# Patient Record
Sex: FEMALE | Race: BLACK OR AFRICAN AMERICAN | NOT HISPANIC OR LATINO | Employment: FULL TIME | URBAN - METROPOLITAN AREA
[De-identification: names, ages, dates, MRNs, and addresses within clinical notes are randomized per-mention and may not be internally consistent; named-entity substitution may affect disease eponyms.]

---

## 2017-03-29 ENCOUNTER — OFFICE VISIT (OUTPATIENT)
Dept: FAMILY MEDICINE CLINIC | Facility: CLINIC | Age: 42
End: 2017-03-29

## 2017-03-29 VITALS
DIASTOLIC BLOOD PRESSURE: 80 MMHG | TEMPERATURE: 98.3 F | OXYGEN SATURATION: 99 % | WEIGHT: 215.2 LBS | HEIGHT: 66 IN | SYSTOLIC BLOOD PRESSURE: 122 MMHG | BODY MASS INDEX: 34.58 KG/M2 | HEART RATE: 67 BPM | RESPIRATION RATE: 16 BRPM

## 2017-03-29 DIAGNOSIS — J32.9 SINUSITIS, UNSPECIFIED CHRONICITY, UNSPECIFIED LOCATION: Primary | ICD-10-CM

## 2017-03-29 DIAGNOSIS — R07.9 CHEST PAIN, UNSPECIFIED TYPE: ICD-10-CM

## 2017-03-29 DIAGNOSIS — G89.29 CHRONIC PAIN OF LEFT KNEE: ICD-10-CM

## 2017-03-29 DIAGNOSIS — M25.562 CHRONIC PAIN OF LEFT KNEE: ICD-10-CM
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
## 2017-03-29 PROCEDURE — 99214 OFFICE O/P EST MOD 30 MIN: CPT | Performed by: NURSE PRACTITIONER

## 2017-03-29 RX ORDER — AMOXICILLIN 875 MG/1
875 TABLET, COATED ORAL 2 TIMES DAILY
Qty: 20 TABLET | Refills: 0 | Status: SHIPPED | OUTPATIENT
Start: 2017-03-29 | End: 2018-01-11

## 2017-03-29 RX ORDER — FLUCONAZOLE 150 MG/1
150 TABLET ORAL ONCE
Qty: 1 TABLET | Refills: 0 | Status: SHIPPED | OUTPATIENT
Start: 2017-03-29 | End: 2017-03-29

## 2017-03-29 NOTE — PROGRESS NOTES
Subjective   Nico Chaudhary is a 41 y.o. female.     History of Present Illness   Knee Pain: Patient presents with knee pain involving the  left knee. Onset of the symptoms was a week ago. Inciting event: none known. Current symptoms include swelling. Pain is aggravated by kneeling, lateral movements, squatting and standing.  Patient has had prior knee problems. Evaluation to date: plain films: normal. Treatment to date: ice.    Upper Respiratory Infection: Patient complains of symptoms of a URI, possible sinusitis. Symptoms include congestion, irritability, sore throat and swollen glands. Onset of symptoms was 10 days ago, unchanged since that time. She also c/o facial pain for the past 6 days .  She is drinking plenty of fluids. Evaluation to date: none. Treatment to date: none.    Patient states she will be she was bitten by about 2 nights ago on her left forearm areas red and slightly swollen.  She's not been using any medications on it.    Patient states she started working out and doing cross fit.  Yesterday during her workouts she felt great she did not have any chest pain, shortness of breath, chest tightness nausea and dizziness or lightheadedness.  After her workouts she felt some pain off and on in her mid sternum.  Patient states yesterday she does a lot of strenuous exercises including jumps, squats with a medicine ball, burpees.   Hurts worse when she turns and looks to the left and she takes a really big deep breath in.    The following portions of the patient's history were reviewed and updated as appropriate: allergies, current medications, past social history and problem list.    Review of Systems   HENT: Positive for postnasal drip, rhinorrhea, sinus pressure and sore throat.    Cardiovascular: Positive for chest pain.   Musculoskeletal: Positive for joint swelling.       Objective   /80 (BP Location: Right arm, Patient Position: Sitting)  Pulse 67  Temp 98.3 °F (36.8 °C) (Oral)    "Resp 16  Ht 66\" (167.6 cm)  Wt 215 lb 3.2 oz (97.6 kg)  SpO2 99%  BMI 34.73 kg/m2  Physical Exam   Constitutional: She is oriented to person, place, and time. Vital signs are normal. She appears well-developed and well-nourished. No distress.   HENT:   Head: Normocephalic.   Right Ear: Tympanic membrane normal.   Left Ear: Tympanic membrane normal.   Nose: Rhinorrhea present.   Mouth/Throat: Posterior oropharyngeal erythema present.   Cardiovascular: Normal rate, regular rhythm and normal heart sounds.    Pulmonary/Chest: Effort normal and breath sounds normal.       Pain with palpation of mid chest palpation along sternum   Lymphadenopathy:     She has cervical adenopathy.   Neurological: She is alert and oriented to person, place, and time. Gait normal.   Skin:        Red area looks like an insect bite on left mid forearm no signs and symptoms of infection   Psychiatric: She has a normal mood and affect. Her behavior is normal. Judgment and thought content normal.   Vitals reviewed.      Assessment/Plan   Problem List Items Addressed This Visit        Respiratory    Sinusitis - Primary    Relevant Medications    amoxicillin (AMOXIL) 875 MG tablet    fluconazole (DIFLUCAN) 150 MG tablet       Nervous and Auditory    Chest pain       Musculoskeletal and Integument    Chronic pain of left knee       Other    Insect bite        Considering insect bite looks good without any infection we will leave it alone at this time.    Plan start amoxicillin for sinus infection return to the office if needed.    Considering she did not have any chest pain spreading shortness of breath nausea or dizziness with exertion and her chest pain reproduced with palpation of the chest I do not believe is cardiac in nature.  Use ibuprofen around the clock and heat to that area.    Patient has chronic left knee problems including a Baker cyst she thinks is been reaggravated considering the workouts that she's doing.  Use ice.  No need to " draw any fluid from it today.

## 2018-01-11 ENCOUNTER — OFFICE VISIT (OUTPATIENT)
Dept: FAMILY MEDICINE CLINIC | Facility: CLINIC | Age: 43
End: 2018-01-11

## 2018-01-11 VITALS
HEART RATE: 59 BPM | HEIGHT: 66 IN | OXYGEN SATURATION: 99 % | SYSTOLIC BLOOD PRESSURE: 134 MMHG | TEMPERATURE: 98.3 F | BODY MASS INDEX: 33.97 KG/M2 | WEIGHT: 211.4 LBS | DIASTOLIC BLOOD PRESSURE: 84 MMHG

## 2018-01-11 DIAGNOSIS — M25.561 ACUTE PAIN OF RIGHT KNEE: Primary | ICD-10-CM

## 2018-01-11 PROCEDURE — 99213 OFFICE O/P EST LOW 20 MIN: CPT | Performed by: NURSE PRACTITIONER

## 2018-01-11 RX ORDER — MONTELUKAST SODIUM 10 MG/1
10 TABLET ORAL NIGHTLY
COMMUNITY
End: 2022-10-05 | Stop reason: SDUPTHER

## 2018-01-11 NOTE — PROGRESS NOTES
"Subjective   Nico Plunkett is a 42 y.o. female.     History of Present Illness   Knee Pain: Patient presents with knee pain involving the  right knee. Onset of the symptoms was over 1 month ago. Inciting event: none known. Current symptoms include giving out, pain located fronty lower knee and swelling. Pain is aggravated by going up and down stairs, kneeling, lateral movements, running and squatting.  Patient has had no prior knee problems. Evaluation to date: none. Treatment to date: ice, OTC analgesics which are somewhat effective and rest.    The following portions of the patient's history were reviewed and updated as appropriate: allergies, current medications, past social history and problem list.    Review of Systems   Constitutional: Negative for fever.   All other systems reviewed and are negative.      Objective   /84 (BP Location: Right arm, Patient Position: Sitting)  Pulse 59  Temp 98.3 °F (36.8 °C)  Ht 167.6 cm (65.98\")  Wt 95.9 kg (211 lb 6.4 oz)  SpO2 99%  BMI 34.14 kg/m2  Physical Exam   Constitutional: She is oriented to person, place, and time. Vital signs are normal. She appears well-developed and well-nourished. No distress.   HENT:   Head: Normocephalic.   Cardiovascular: Normal rate, regular rhythm and normal heart sounds.    Pulmonary/Chest: Effort normal and breath sounds normal.   Neurological: She is alert and oriented to person, place, and time. Gait normal.   Psychiatric: She has a normal mood and affect. Her behavior is normal. Judgment and thought content normal.   Vitals reviewed.      Assessment/Plan   Problem List Items Addressed This Visit        Musculoskeletal and Integument    Acute pain of right knee - Primary    Relevant Medications    diclofenac (VOLTAREN) 50 MG EC tablet        Patellar tentonitis     KT tape  Rest and stop all offending exercises for 2-4 weeks  rto if needed  "

## 2020-05-28 ENCOUNTER — TELEPHONE (OUTPATIENT)
Dept: FAMILY MEDICINE CLINIC | Facility: CLINIC | Age: 45
End: 2020-05-28

## 2020-05-28 NOTE — TELEPHONE ENCOUNTER
PT CALLED SAYING THAT SHE BELIEVES THAT SHE MY HAVE BROKEN HER 4TH TOE ON HER LEFT FOOT. SHE SAYS THAT IS SWOLLEN AND EVEN SWOLLEN INTO THE FOOT. IT IS NOT DISCOLORED. PT WOULD LIKE TO KNOW IF SHE CAN GET AN ORDER FOR AN XRAY?     CALL BACK# [phone]

## 2020-06-04 NOTE — TELEPHONE ENCOUNTER
Lt message. Urgent care has a better supply of splints but if pt prefers to be evaluated in office. We can schedule pt with Dr.von Swift and go over office protocol with pt.

## 2020-06-04 NOTE — TELEPHONE ENCOUNTER
As of now no availability on Fabio's schedule for an appointment today or tomorrow, unless Fabio wants to look at schedule and fit pt in? Or refer pt to urgent care? Please advise.

## 2022-10-05 ENCOUNTER — OFFICE VISIT (OUTPATIENT)
Dept: INTERNAL MEDICINE | Facility: CLINIC | Age: 47
End: 2022-10-05

## 2022-10-05 VITALS
OXYGEN SATURATION: 100 % | RESPIRATION RATE: 16 BRPM | HEIGHT: 67 IN | DIASTOLIC BLOOD PRESSURE: 78 MMHG | HEART RATE: 75 BPM | SYSTOLIC BLOOD PRESSURE: 128 MMHG | TEMPERATURE: 97.7 F | BODY MASS INDEX: 36.66 KG/M2 | WEIGHT: 233.6 LBS

## 2022-10-05 DIAGNOSIS — Z00.00 ENCOUNTER FOR MEDICAL EXAMINATION TO ESTABLISH CARE: ICD-10-CM

## 2022-10-05 DIAGNOSIS — Z76.0 MEDICATION REFILL: ICD-10-CM

## 2022-10-05 DIAGNOSIS — J01.10 ACUTE NON-RECURRENT FRONTAL SINUSITIS: ICD-10-CM

## 2022-10-05 DIAGNOSIS — I10 PRIMARY HYPERTENSION: Primary | ICD-10-CM

## 2022-10-05 PROCEDURE — 99203 OFFICE O/P NEW LOW 30 MIN: CPT | Performed by: NURSE PRACTITIONER

## 2022-10-05 RX ORDER — AMLODIPINE BESYLATE 2.5 MG/1
2.5 TABLET ORAL DAILY
COMMUNITY
Start: 2022-10-02 | End: 2022-10-05

## 2022-10-05 RX ORDER — MONTELUKAST SODIUM 10 MG/1
10 TABLET ORAL NIGHTLY
Qty: 90 TABLET | Refills: 0 | Status: SHIPPED | OUTPATIENT
Start: 2022-10-05 | End: 2023-01-03

## 2022-10-05 RX ORDER — AMLODIPINE BESYLATE 2.5 MG/1
2.5 TABLET ORAL DAILY
Qty: 90 TABLET | Refills: 0 | Status: SHIPPED | OUTPATIENT
Start: 2022-10-05 | End: 2022-10-12

## 2022-10-05 RX ORDER — AMOXICILLIN AND CLAVULANATE POTASSIUM 875; 125 MG/1; MG/1
1 TABLET, FILM COATED ORAL 2 TIMES DAILY
Qty: 20 TABLET | Refills: 0 | Status: SHIPPED | OUTPATIENT
Start: 2022-10-05 | End: 2022-10-15

## 2022-10-05 RX ORDER — FLUCONAZOLE 150 MG/1
150 TABLET ORAL ONCE
Qty: 1 TABLET | Refills: 0 | Status: SHIPPED | OUTPATIENT
Start: 2022-10-05 | End: 2022-10-05

## 2022-10-05 NOTE — ASSESSMENT & PLAN NOTE
Hypertension is improving with treatment.  Continue current treatment regimen.  Dietary sodium restriction.  Weight loss.  Regular aerobic exercise.  Continue current medications.  Blood pressure will be reassessed in 4 weeks.  Amlodipine started. Denies any side effects at this time.   BP at goal 128/78.  She will do ambulatory monitoring at home.   If she develops any SOB or chest pain, she knows to go to the ER.

## 2022-10-05 NOTE — PROGRESS NOTES
"Chief Complaint  Establish Care and Hypertension (Flu shot )    Subjective        Nico Lemus presents to [facility]  History of Present Illness    Pleasant 46 year old female who is new to me and the office.   She previously saw a [facility] provider who is no longer there.   Recent hypertensive episode with H/A.   ER visit on 10/01/2022. They started patient on Amlodipine 2.5 mg tablet daily.   Her pressure today is at goal 128/78. Denies any chest pain/pressure at this time.     CT head negative for acute intracranial abnormality and dx with frontal sinus disease.   Patient c/o frontal sinus pressure and congestion greater than 7 days.     Objective   Vital Signs:  /78 (BP Location: Left arm, Patient Position: Sitting, Cuff Size: Adult)   Pulse 75   Temp 97.7 °F (36.5 °C) (Oral)   Resp 16   Ht 170.2 cm (67\")   Wt 106 kg (233 lb 9.6 oz)   SpO2 100%   BMI 36.59 kg/m²   Estimated body mass index is 36.59 kg/m² as calculated from the following:    Height as of this encounter: 170.2 cm (67\").    Weight as of this encounter: 106 kg (233 lb 9.6 oz).          Physical Exam  Vitals and nursing note reviewed.   Constitutional:       Appearance: Normal appearance. She is obese.   HENT:      Head: Normocephalic.      Nose: Congestion present.      Comments: Patient c/o frontal sinus pressure and congestion greater than 7 days.      Mouth/Throat:      Mouth: Mucous membranes are moist.   Eyes:      Pupils: Pupils are equal, round, and reactive to light.   Cardiovascular:      Rate and Rhythm: Normal rate and regular rhythm.      Pulses: Normal pulses.      Heart sounds: Normal heart sounds.      Comments: No peripheral edema noted.   Pulmonary:      Effort: Pulmonary effort is normal. No respiratory distress.      Breath sounds: Normal breath sounds. No stridor. No wheezing, rhonchi or rales.      Comments: Denies SOB  Chest:      Chest wall: No tenderness. "   Musculoskeletal:         General: Normal range of motion.   Skin:     General: Skin is warm.      Capillary Refill: Capillary refill takes less than 2 seconds.   Neurological:      Mental Status: She is alert and oriented to person, place, and time.   Psychiatric:         Behavior: Behavior normal.        Result Review :             Data reviewed from ER visit from 10/01/2022 with labs and CT scan as well.      Assessment and Plan   Diagnoses and all orders for this visit:    1. Primary hypertension (Primary)  Assessment & Plan:  Hypertension is improving with treatment.  Continue current treatment regimen.  Dietary sodium restriction.  Weight loss.  Regular aerobic exercise.  Continue current medications.  Blood pressure will be reassessed in 4 weeks.  Amlodipine started. Denies any side effects at this time.   BP at goal 128/78.  She will do ambulatory monitoring at home.   If she develops any SOB or chest pain, she knows to go to the ER.       2. Encounter for medical examination to establish care    3. Acute non-recurrent frontal sinusitis    4. Medication refill    Other orders  -     amLODIPine (NORVASC) 2.5 MG tablet; Take 1 tablet by mouth Daily for 90 days.  Dispense: 90 tablet; Refill: 0  -     amoxicillin-clavulanate (Augmentin) 875-125 MG per tablet; Take 1 tablet by mouth 2 (Two) Times a Day for 10 days.  Dispense: 20 tablet; Refill: 0  -     montelukast (SINGULAIR) 10 MG tablet; Take 1 tablet by mouth Every Night for 90 days.  Dispense: 90 tablet; Refill: 0  -     fluconazole (Diflucan) 150 MG tablet; Take 1 tablet by mouth 1 (One) Time for 1 dose.  Dispense: 1 tablet; Refill: 0    Medications as ordered.   She will come in the office in 4 weeks for her Annual PE.          Follow Up   Return in about 4 weeks (around 11/2/2022) for Annual physical.  Patient was given instructions and counseling regarding her condition or for health maintenance advice. Please see specific information pulled into the  AVS if appropriate.

## 2022-10-12 ENCOUNTER — OFFICE VISIT (OUTPATIENT)
Dept: INTERNAL MEDICINE | Facility: CLINIC | Age: 47
End: 2022-10-12

## 2022-10-12 ENCOUNTER — TELEPHONE (OUTPATIENT)
Dept: INTERNAL MEDICINE | Facility: CLINIC | Age: 47
End: 2022-10-12

## 2022-10-12 VITALS
SYSTOLIC BLOOD PRESSURE: 130 MMHG | TEMPERATURE: 98.9 F | WEIGHT: 245.8 LBS | DIASTOLIC BLOOD PRESSURE: 78 MMHG | HEIGHT: 67 IN | BODY MASS INDEX: 38.58 KG/M2

## 2022-10-12 DIAGNOSIS — I10 PRIMARY HYPERTENSION: Primary | ICD-10-CM

## 2022-10-12 DIAGNOSIS — R42 DIZZINESS: ICD-10-CM

## 2022-10-12 PROCEDURE — 99213 OFFICE O/P EST LOW 20 MIN: CPT | Performed by: NURSE PRACTITIONER

## 2022-10-12 NOTE — PROGRESS NOTES
"Chief Complaint  No chief complaint on file.    Subjective        Nico Lemus presents to [facility]  History of Present Illness    Patient is a pleasant 46 year old female who I have seen in the office one time previously.     Patient states she is having some dizziness intermittently with some swelling to bilateral legs and abdomen over the last week.     She denies any chest pain/pressure at this time.     She has been taking Augmentin for an acute sinus infection and doing well.         Objective   Vital Signs:  /78   Temp 98.9 °F (37.2 °C) (Temporal)   Ht 170.2 cm (67\")   Wt 111 kg (245 lb 12.8 oz)   BMI 38.50 kg/m²   Estimated body mass index is 38.5 kg/m² as calculated from the following:    Height as of this encounter: 170.2 cm (67\").    Weight as of this encounter: 111 kg (245 lb 12.8 oz).          Physical Exam  Vitals and nursing note reviewed.   Constitutional:       Appearance: She is obese.   HENT:      Head: Normocephalic.      Nose: Nose normal. No congestion or rhinorrhea.      Mouth/Throat:      Mouth: Mucous membranes are moist.   Eyes:      Pupils: Pupils are equal, round, and reactive to light.   Cardiovascular:      Rate and Rhythm: Normal rate and regular rhythm.      Pulses: Normal pulses.      Heart sounds: Normal heart sounds.      Comments: No peripheral edema noted.   Pulmonary:      Effort: Pulmonary effort is normal. No respiratory distress.      Breath sounds: Normal breath sounds. No stridor. No wheezing, rhonchi or rales.      Comments: Denies SOB  Chest:      Chest wall: No tenderness.   Musculoskeletal:         General: Normal range of motion.   Skin:     General: Skin is warm.      Capillary Refill: Capillary refill takes less than 2 seconds.   Neurological:      Mental Status: She is alert and oriented to person, place, and time.   Psychiatric:         Behavior: Behavior normal.        Result Review :                  Assessment " and Plan   Diagnoses and all orders for this visit:    1. Primary hypertension (Primary)    2. Dizziness    Patient will stop taking her amlodipine at this time.   She will continue to do her ambulatory blood pressure monitoring.   Ambulatory blood pressure monitoring for the next 3 weeks.  With any chest pain/pressure/SOB please go to ER.   140/90 too high and call me.   120/80 130/85 normal and fine.     Augmentin take with food  Singular take at bedtime            Follow Up   Return for Next scheduled follow up.  Patient was given instructions and counseling regarding her condition or for health maintenance advice. Please see specific information pulled into the AVS if appropriate.

## 2022-10-12 NOTE — TELEPHONE ENCOUNTER
Patient notified and expressed understanding.  She said that she would rather come into the office to see Naz but that if she gets worse she will go to the ER.  Patient scheduled an appointment for today.

## 2022-10-12 NOTE — PATIENT INSTRUCTIONS
Ambulatory blood pressure monitoring for the next 3 weeks.  With any chest pain/pressure/SOB please go to ER.   140/90 too high and call me.   120/80 130/85 normal and fine.     Augmentin take with food  Singular take at bedtime

## 2022-10-12 NOTE — TELEPHONE ENCOUNTER
Please have her stop the medication and come into the office. If she is still experiencing dizziness, chest pain/pressure, SOB, or any difficulty breathing/swallowing she needs to go to the ER. Thank you, Naz

## 2022-10-12 NOTE — TELEPHONE ENCOUNTER
Caller: Nico Edwards    Relationship to patient: Self    Best call back number: [phone]    Chief complaint: SWELLING, DIZZY (WHEN SITTING DOWN)    Patient verbalized understanding: [x] Yes  [] No      Additional notes: PATIENT STATES SHE IS EXPERIENCING DIZZINESS AND SWELLING. SHE STATES SHE JUST STARTED A NEW BLOOD PRESSURE MEDIATION AND THESE SYMPTOMS DIDN'T START UNTIL AFTERWARDS. PATIENT SOUNDED WILLING TO GO TO THE EMERGENCY ROOM. PLEASE CALL AND ADVISE IF NECESSARY.

## 2022-11-14 ENCOUNTER — OFFICE VISIT (OUTPATIENT)
Dept: INTERNAL MEDICINE | Facility: CLINIC | Age: 47
End: 2022-11-14

## 2022-11-14 VITALS
DIASTOLIC BLOOD PRESSURE: 98 MMHG | OXYGEN SATURATION: 100 % | SYSTOLIC BLOOD PRESSURE: 140 MMHG | HEART RATE: 83 BPM | WEIGHT: 246.8 LBS | HEIGHT: 67 IN | BODY MASS INDEX: 38.74 KG/M2

## 2022-11-14 DIAGNOSIS — Z00.00 ANNUAL PHYSICAL EXAM: ICD-10-CM

## 2022-11-14 DIAGNOSIS — I10 PRIMARY HYPERTENSION: Primary | ICD-10-CM

## 2022-11-14 DIAGNOSIS — I10 PRIMARY HYPERTENSION: ICD-10-CM

## 2022-11-14 DIAGNOSIS — Z23 NEEDS FLU SHOT: ICD-10-CM

## 2022-11-14 PROCEDURE — 90686 IIV4 VACC NO PRSV 0.5 ML IM: CPT | Performed by: NURSE PRACTITIONER

## 2022-11-14 PROCEDURE — 99214 OFFICE O/P EST MOD 30 MIN: CPT | Performed by: NURSE PRACTITIONER

## 2022-11-14 PROCEDURE — 90471 IMMUNIZATION ADMIN: CPT | Performed by: NURSE PRACTITIONER

## 2022-11-14 PROCEDURE — 99396 PREV VISIT EST AGE 40-64: CPT | Performed by: NURSE PRACTITIONER

## 2022-11-14 RX ORDER — LISINOPRIL 10 MG/1
10 TABLET ORAL DAILY
Qty: 90 TABLET | Refills: 1 | Status: SHIPPED | OUTPATIENT
Start: 2022-11-14 | End: 2023-02-12

## 2022-11-14 NOTE — ASSESSMENT & PLAN NOTE
Hypertension is unchanged.  Dietary sodium restriction.  Weight loss.  Regular aerobic exercise.  Medication changes per orders.  Blood pressure will be reassessed in 4 weeks.  I have sent over prescription for lisinopril 10 mg tablet for patient to start taking today.  Patient will continue current ambulatory blood pressure monitoring, monitor sodium in diet and she will follow back up in 4 weeks for a recheck.

## 2022-11-14 NOTE — PROGRESS NOTES
"Chief Complaint  Annual Exam    Subjective        Nico Lemus presents to [facility]  History of Present Illness    Patient is a pleasant 47-year-old female who I have seen in the office previously.  She is here for annual physical exam and for healthcare maintenance.  Patient has a history of elevated blood pressure without the diagnosis of hypertension.  Patient brought her log in for her blood pressures and what she has been doing at home and the average blood pressures are greater than 140/90 exceeding two 179/122.  Patient denies any chest pain or shortness of breath during any elevation at home.    Mother, brother, and sister all have hypertension.    Patient has had a previous hysterectomy.  No other complaints on today's office visit.      Objective   Vital Signs:  /98 (BP Location: Left arm, Patient Position: Sitting, Cuff Size: Adult)   Pulse 83   Ht 170.2 cm (67\")   Wt 112 kg (246 lb 12.8 oz)   SpO2 100%   BMI 38.65 kg/m²   Estimated body mass index is 38.65 kg/m² as calculated from the following:    Height as of this encounter: 170.2 cm (67\").    Weight as of this encounter: 112 kg (246 lb 12.8 oz).          Physical Exam  Vitals and nursing note reviewed.   Constitutional:       Appearance: She is obese.   HENT:      Head: Normocephalic.      Right Ear: Tympanic membrane, ear canal and external ear normal. There is no impacted cerumen.      Left Ear: Tympanic membrane and ear canal normal. There is no impacted cerumen.      Nose: Nose normal.      Mouth/Throat:      Mouth: Mucous membranes are moist.   Eyes:      Pupils: Pupils are equal, round, and reactive to light.   Cardiovascular:      Rate and Rhythm: Normal rate and regular rhythm.      Pulses: Normal pulses.      Heart sounds: Normal heart sounds.      Comments: No peripheral edema noted  Pulmonary:      Effort: Pulmonary effort is normal. No respiratory distress.      Breath sounds: Normal " breath sounds. No stridor. No wheezing, rhonchi or rales.      Comments: Denies shortness of breath  Chest:      Chest wall: No tenderness.   Skin:     General: Skin is warm.      Capillary Refill: Capillary refill takes less than 2 seconds.   Neurological:      Mental Status: She is alert and oriented to person, place, and time.   Psychiatric:         Behavior: Behavior normal.        Result Review :             Reviewed laboratory test from October 22, 2022.  All normal(CMP, lipid panel, HIV testing, urinalysis).     Assessment and Plan   Diagnoses and all orders for this visit:    1. Primary hypertension (Primary)  Assessment & Plan:  Hypertension is unchanged.  Dietary sodium restriction.  Weight loss.  Regular aerobic exercise.  Medication changes per orders.  Blood pressure will be reassessed in 4 weeks.  I have sent over prescription for lisinopril 10 mg tablet for patient to start taking today.  Patient will continue current ambulatory blood pressure monitoring, monitor sodium in diet and she will follow back up in 4 weeks for a recheck.    Orders:  -     Cancel: CBC & Differential  -     Cancel: CBC & Differential; Future  -     CBC & Differential; Future    2. Annual physical exam  -     Cancel: CBC & Differential  -     Cancel: CBC & Differential; Future  -     CBC & Differential; Future    3. Needs flu shot    Other orders  -     lisinopril (PRINIVIL,ZESTRIL) 10 MG tablet; Take 1 tablet by mouth Daily for 90 days.  Dispense: 90 tablet; Refill: 1  -     FluLaval/Fluzone >6 mos ([id number])    Patient will follow-up in 4 weeks for recheck on her hypertension.  Flu shot was given on today's office visit.  Patient will contact the office as needed.  Patient also go to the hospital if she develops any shortness of breath or chest pain.       Follow Up   Return in about 4 weeks (around 12/12/2022) for Recheck.  Patient was given instructions and counseling regarding her condition or for health maintenance  advice. Please see specific information pulled into the AVS if appropriate.

## 2022-12-21 ENCOUNTER — LAB (OUTPATIENT)
Dept: LAB | Facility: HOSPITAL | Age: 47
End: 2022-12-21

## 2022-12-21 ENCOUNTER — OFFICE VISIT (OUTPATIENT)
Dept: INTERNAL MEDICINE | Facility: CLINIC | Age: 47
End: 2022-12-21

## 2022-12-21 VITALS
DIASTOLIC BLOOD PRESSURE: 76 MMHG | SYSTOLIC BLOOD PRESSURE: 124 MMHG | OXYGEN SATURATION: 98 % | BODY MASS INDEX: 36.79 KG/M2 | HEIGHT: 67 IN | RESPIRATION RATE: 18 BRPM | WEIGHT: 234.4 LBS | HEART RATE: 95 BPM

## 2022-12-21 DIAGNOSIS — Z09 FOLLOW-UP EXAM: Primary | ICD-10-CM

## 2022-12-21 DIAGNOSIS — I10 PRIMARY HYPERTENSION: ICD-10-CM

## 2022-12-21 LAB
BASOPHILS # BLD AUTO: 0.04 10*3/MM3 (ref 0–0.2)
BASOPHILS NFR BLD AUTO: 0.5 % (ref 0–1.5)
DEPRECATED RDW RBC AUTO: 40 FL (ref 37–54)
EOSINOPHIL # BLD AUTO: 0.14 10*3/MM3 (ref 0–0.4)
EOSINOPHIL NFR BLD AUTO: 1.9 % (ref 0.3–6.2)
ERYTHROCYTE [DISTWIDTH] IN BLOOD BY AUTOMATED COUNT: 12 % (ref 12.3–15.4)
HCT VFR BLD AUTO: 37.7 % (ref 34–46.6)
HGB BLD-MCNC: 11.7 G/DL (ref 12–15.9)
IMM GRANULOCYTES # BLD AUTO: 0.02 10*3/MM3 (ref 0–0.05)
IMM GRANULOCYTES NFR BLD AUTO: 0.3 % (ref 0–0.5)
LYMPHOCYTES # BLD AUTO: 2.16 10*3/MM3 (ref 0.7–3.1)
LYMPHOCYTES NFR BLD AUTO: 29.1 % (ref 19.6–45.3)
MCH RBC QN AUTO: 28.2 PG (ref 26.6–33)
MCHC RBC AUTO-ENTMCNC: 31 G/DL (ref 31.5–35.7)
MCV RBC AUTO: 90.8 FL (ref 79–97)
MONOCYTES # BLD AUTO: 0.32 10*3/MM3 (ref 0.1–0.9)
MONOCYTES NFR BLD AUTO: 4.3 % (ref 5–12)
NEUTROPHILS NFR BLD AUTO: 4.75 10*3/MM3 (ref 1.7–7)
NEUTROPHILS NFR BLD AUTO: 63.9 % (ref 42.7–76)
NRBC BLD AUTO-RTO: 0 /100 WBC (ref 0–0.2)
PLATELET # BLD AUTO: 334 10*3/MM3 (ref 140–450)
PMV BLD AUTO: 12.3 FL (ref 6–12)
RBC # BLD AUTO: 4.15 10*6/MM3 (ref 3.77–5.28)
WBC NRBC COR # BLD: 7.43 10*3/MM3 (ref 3.4–10.8)

## 2022-12-21 PROCEDURE — 99213 OFFICE O/P EST LOW 20 MIN: CPT | Performed by: NURSE PRACTITIONER

## 2022-12-21 PROCEDURE — 36415 COLL VENOUS BLD VENIPUNCTURE: CPT

## 2022-12-21 PROCEDURE — 85025 COMPLETE CBC W/AUTO DIFF WBC: CPT | Performed by: NURSE PRACTITIONER

## 2022-12-21 NOTE — ASSESSMENT & PLAN NOTE
Hypertension is improving with treatment.  Continue current treatment regimen.  Dietary sodium restriction.  Weight loss.  Regular aerobic exercise.  Continue current medications.  Blood pressure will be reassessed in 4 weeks.    Patient is currently taking lisinopril 10 mg tablet daily.  Patient denies any side effects at this time.  Patient will continue ambulatory blood pressure monitoring at home.  I will see patient in 4 weeks to reassess blood pressure at this time.  Patient will continue to eat healthy, make good choices, exercise at least 3 times a week by walking 30 minutes at a time.

## 2022-12-21 NOTE — PROGRESS NOTES
"Chief Complaint  Hypertension follow up  Weight loss follow up    Subjective        Nico Lemus presents to [facility]  History of Present Illness    Patient is here today for a follow up visit on her hypertension and weight loss journey. She has lost 12 pounds since her last visit.    She is now doing Allergy shots (2 reactions) twice a week and reports feeling better at this time.    Normal Pap and Mammogram this year. She follows her OB/GYN for this.   Colonoscopy screening completed and normal at 46 (07/27/2022).  BP is now at goal 127/76.  No other complaints on today's office visit.      Objective   Vital Signs:  /76   Pulse 95   Resp 18   Ht 170.2 cm (67\")   Wt 106 kg (234 lb 6.4 oz)   SpO2 98%   BMI 36.71 kg/m²   Estimated body mass index is 36.71 kg/m² as calculated from the following:    Height as of this encounter: 170.2 cm (67\").    Weight as of this encounter: 106 kg (234 lb 6.4 oz).          Physical Exam  Vitals and nursing note reviewed.   Constitutional:       Appearance: Normal appearance. She is obese.   HENT:      Head: Normocephalic.      Nose: Nose normal.      Mouth/Throat:      Mouth: Mucous membranes are moist.   Eyes:      Pupils: Pupils are equal, round, and reactive to light.   Cardiovascular:      Rate and Rhythm: Normal rate and regular rhythm.      Comments: No peripheral edema noted.   Pulmonary:      Effort: Pulmonary effort is normal. No respiratory distress.      Breath sounds: Normal breath sounds. No stridor. No wheezing, rhonchi or rales.      Comments: Denies shortness of breath  Chest:      Chest wall: No tenderness.   Musculoskeletal:         General: Normal range of motion.   Skin:     General: Skin is warm.      Capillary Refill: Capillary refill takes less than 2 seconds.   Neurological:      Mental Status: She is alert and oriented to person, place, and time.   Psychiatric:         Behavior: Behavior normal.      "   Result Review :  The following data was reviewed by: MELISSA Stapleton on 12/21/2022:           CBC AND DIFFERENTIAL (10/02/2022 02:46)  COMPREHENSIVE METABOLIC PANEL (10/02/2022 02:46)  POCT URINALYSIS DIPSTICK, AUTOMATED (07/17/2022 15:12)        Assessment and Plan   Diagnoses and all orders for this visit:    1. Follow-up exam (Primary)    2. Primary hypertension  Assessment & Plan:  Hypertension is improving with treatment.  Continue current treatment regimen.  Dietary sodium restriction.  Weight loss.  Regular aerobic exercise.  Continue current medications.  Blood pressure will be reassessed in 4 weeks.    Patient is currently taking lisinopril 10 mg tablet daily.  Patient denies any side effects at this time.  Patient will continue ambulatory blood pressure monitoring at home.  I will see patient in 4 weeks to reassess blood pressure at this time.  Patient will continue to eat healthy, make good choices, exercise at least 3 times a week by walking 30 minutes at a time.             Follow Up   Return in about 4 weeks (around 1/18/2023) for Recheck.  Patient was given instructions and counseling regarding her condition or for health maintenance advice. Please see specific information pulled into the AVS if appropriate.

## 2023-01-03 RX ORDER — MONTELUKAST SODIUM 10 MG/1
TABLET ORAL
Qty: 90 TABLET | Refills: 0 | Status: SHIPPED | OUTPATIENT
Start: 2023-01-03

## 2023-01-20 ENCOUNTER — OFFICE VISIT (OUTPATIENT)
Dept: INTERNAL MEDICINE | Facility: CLINIC | Age: 48
End: 2023-01-20
Payer: COMMERCIAL

## 2023-01-20 VITALS
TEMPERATURE: 98.3 F | SYSTOLIC BLOOD PRESSURE: 126 MMHG | DIASTOLIC BLOOD PRESSURE: 82 MMHG | BODY MASS INDEX: 35.94 KG/M2 | WEIGHT: 229 LBS | HEART RATE: 72 BPM | RESPIRATION RATE: 14 BRPM | OXYGEN SATURATION: 98 % | HEIGHT: 67 IN

## 2023-01-20 DIAGNOSIS — E66.01 MORBID (SEVERE) OBESITY DUE TO EXCESS CALORIES: ICD-10-CM

## 2023-01-20 DIAGNOSIS — R53.83 OTHER FATIGUE: ICD-10-CM

## 2023-01-20 DIAGNOSIS — R73.09 ELEVATED GLUCOSE LEVEL: ICD-10-CM

## 2023-01-20 DIAGNOSIS — Z00.00 HEALTHCARE MAINTENANCE: Primary | ICD-10-CM

## 2023-01-20 DIAGNOSIS — I10 PRIMARY HYPERTENSION: ICD-10-CM

## 2023-01-20 PROCEDURE — 99214 OFFICE O/P EST MOD 30 MIN: CPT | Performed by: NURSE PRACTITIONER

## 2023-01-20 NOTE — PATIENT INSTRUCTIONS
[email or web address] and you can put your insurance information in this website and it will tell you your coverage cost.  I believe you can also get some kind of coupon card for the first month possibly for free

## 2023-01-20 NOTE — PROGRESS NOTES
"Answers for HPI/ROS submitted by the patient on 1/19/2023  What is the primary reason for your visit?: High Blood Pressure    Chief Complaint  Follow-up (1 month follow up ) and Hypertension    Subjective        Lamika R Frederick Lemus presents to [facility]  History of Present Illness    Patient is a pleasant 47 year old female who I have seen in the office previously.  Here for obesity, hypertension, and R ear tinnitus.   She has currently been tracking her weight loss with daily caloric log with exercise, and BP readings.     BMI at 35.87 she has lost another 5 pounds since her last visit.     Objective   Vital Signs:  /82 (BP Location: Right arm, Patient Position: Sitting, Cuff Size: Large Adult)   Pulse 72   Temp 98.3 °F (36.8 °C) (Temporal)   Resp 14   Ht 170.2 cm (67\")   Wt 104 kg (229 lb)   SpO2 98%   BMI 35.87 kg/m²   Estimated body mass index is 35.87 kg/m² as calculated from the following:    Height as of this encounter: 170.2 cm (67\").    Weight as of this encounter: 104 kg (229 lb).             Physical Exam  Vitals and nursing note reviewed.   Constitutional:       Appearance: Normal appearance. She is obese.   HENT:      Head: Normocephalic.      Right Ear: Tympanic membrane, ear canal and external ear normal. There is no impacted cerumen.      Left Ear: Tympanic membrane, ear canal and external ear normal. There is no impacted cerumen.      Ears:      Comments: Off and on tinnitus x 1 week  Denies pain     Nose: Nose normal.      Mouth/Throat:      Mouth: Mucous membranes are moist.   Eyes:      Pupils: Pupils are equal, round, and reactive to light.   Cardiovascular:      Rate and Rhythm: Normal rate and regular rhythm.      Pulses: Normal pulses.      Heart sounds: Normal heart sounds.      Comments: No peripheral edema noted.   Pulmonary:      Effort: Pulmonary effort is normal. No respiratory distress.      Breath sounds: Normal breath sounds. No stridor. " No wheezing, rhonchi or rales.   Chest:      Chest wall: No tenderness.   Musculoskeletal:         General: Normal range of motion.   Skin:     General: Skin is warm.      Capillary Refill: Capillary refill takes less than 2 seconds.   Neurological:      Mental Status: She is alert and oriented to person, place, and time.   Psychiatric:         Behavior: Behavior normal.        Result Review :    Common labs    Common Labs 12/21/22   WBC 7.43   Hemoglobin 11.7 (A)   Hematocrit 37.7   Platelets 334   (A) Abnormal value                         Assessment and Plan   Diagnoses and all orders for this visit:    1. Healthcare maintenance (Primary)  -     CBC & Differential; Future  -     Comprehensive Metabolic Panel; Future  -     Hemoglobin A1c; Future  -     Lipid Panel; Future  -     TSH Rfx On Abnormal To Free T4; Future  -     Semaglutide-Weight Management solution auto-injector 0.25 mg    2. Morbid (severe) obesity due to excess calories (HCC)  -     CBC & Differential; Future  -     Comprehensive Metabolic Panel; Future  -     Hemoglobin A1c; Future  -     Lipid Panel; Future  -     TSH Rfx On Abnormal To Free T4; Future  -     Semaglutide-Weight Management solution auto-injector 0.25 mg    3. Primary hypertension  -     CBC & Differential; Future  -     Comprehensive Metabolic Panel; Future  -     Hemoglobin A1c; Future  -     Lipid Panel; Future  -     TSH Rfx On Abnormal To Free T4; Future  -     Semaglutide-Weight Management solution auto-injector 0.25 mg    4. Elevated glucose level  -     CBC & Differential; Future  -     Comprehensive Metabolic Panel; Future  -     Hemoglobin A1c; Future  -     Lipid Panel; Future  -     TSH Rfx On Abnormal To Free T4; Future  -     Semaglutide-Weight Management solution auto-injector 0.25 mg    5. Other fatigue  -     CBC & Differential; Future  -     Comprehensive Metabolic Panel; Future  -     Hemoglobin A1c; Future  -     Lipid Panel; Future  -     TSH Rfx On Abnormal  To Free T4; Future  -     Semaglutide-Weight Management solution auto-injector 0.25 mg    We will start patient on Wegovy 0.25 mg's per week for the first week.   She will  medication and come in the office for training this Friday.   She agrees with treatment plan at this time.   No history of Thyroid cancer in her family or any thyroid problems in her history.     She will take mucinex as needed for fluid behind her ears.   If she gets worse, she will call the office.            Follow Up   Return in about 1 week (around 1/27/2023).  Patient was given instructions and counseling regarding her condition or for health maintenance advice. Please see specific information pulled into the AVS if appropriate.

## 2023-01-26 DIAGNOSIS — R73.09 ELEVATED GLUCOSE LEVEL: ICD-10-CM

## 2023-01-26 DIAGNOSIS — I10 PRIMARY HYPERTENSION: ICD-10-CM

## 2023-01-26 DIAGNOSIS — E66.01 MORBID (SEVERE) OBESITY DUE TO EXCESS CALORIES: ICD-10-CM

## 2023-01-26 DIAGNOSIS — R53.83 OTHER FATIGUE: ICD-10-CM

## 2023-01-26 DIAGNOSIS — Z00.00 HEALTHCARE MAINTENANCE: ICD-10-CM

## 2023-01-26 NOTE — TELEPHONE ENCOUNTER
PATIENT IS CALLING IN SHE STATES THAT PHARMACY IS NEEDING PA OR SOMETHING TO HELP GET THIS COVERED UNDER INSURANCE.      CONFIRMED PHARMACY  [id number] - [place], [place] - [facility] AT [address] [place] [address] - [phone] [facility] [phone] FX

## 2023-02-02 ENCOUNTER — TELEPHONE (OUTPATIENT)
Dept: INTERNAL MEDICINE | Facility: CLINIC | Age: 48
End: 2023-02-02
Payer: COMMERCIAL

## 2023-04-17 ENCOUNTER — TELEPHONE (OUTPATIENT)
Dept: INTERNAL MEDICINE | Facility: CLINIC | Age: 48
End: 2023-04-17

## 2023-04-17 NOTE — TELEPHONE ENCOUNTER
Caller: Nico Haynes    Relationship: Self    Best call back number: [phone]    Who are you requesting to speak with (clinical staff, provider,  specific staff member): EBONI     What was the call regarding: PATIENT IS REQUESTING A CALL REGARDING THE APPROVAL OF THE Semaglutide-Weight Management 0.25 MG/0.5ML solution auto-injector.     PLEASE CALL TO DISCUSS AND ADVISE.

## 2023-05-08 RX ORDER — LISINOPRIL 10 MG/1
TABLET ORAL
Qty: 90 TABLET | Refills: 1 | Status: SHIPPED | OUTPATIENT
Start: 2023-05-08

## 2023-10-17 ENCOUNTER — OFFICE VISIT (OUTPATIENT)
Dept: INTERNAL MEDICINE | Facility: CLINIC | Age: 48
End: 2023-10-17
Payer: COMMERCIAL

## 2023-10-17 VITALS
BODY MASS INDEX: 34.69 KG/M2 | WEIGHT: 221 LBS | RESPIRATION RATE: 18 BRPM | HEART RATE: 66 BPM | DIASTOLIC BLOOD PRESSURE: 80 MMHG | TEMPERATURE: 97 F | SYSTOLIC BLOOD PRESSURE: 122 MMHG | HEIGHT: 67 IN

## 2023-10-17 DIAGNOSIS — I10 PRIMARY HYPERTENSION: Primary | ICD-10-CM

## 2023-10-17 DIAGNOSIS — F32.A ANXIETY AND DEPRESSION: ICD-10-CM

## 2023-10-17 DIAGNOSIS — Z13.220 SCREENING, LIPID: ICD-10-CM

## 2023-10-17 DIAGNOSIS — Z11.59 NEED FOR HEPATITIS C SCREENING TEST: ICD-10-CM

## 2023-10-17 DIAGNOSIS — F41.9 ANXIETY AND DEPRESSION: ICD-10-CM

## 2023-10-17 DIAGNOSIS — Z00.00 HEALTHCARE MAINTENANCE: ICD-10-CM

## 2023-10-17 DIAGNOSIS — E66.01 MORBID (SEVERE) OBESITY DUE TO EXCESS CALORIES: ICD-10-CM

## 2023-10-17 DIAGNOSIS — Z76.0 MEDICATION REFILL: ICD-10-CM

## 2023-10-17 DIAGNOSIS — Z13.1 SCREENING FOR DIABETES MELLITUS: ICD-10-CM

## 2023-10-17 PROBLEM — M25.569 GONALGIA: Status: ACTIVE | Noted: 2018-01-11

## 2023-10-17 PROBLEM — B37.31 CANDIDA VAGINITIS: Status: ACTIVE | Noted: 2023-10-17

## 2023-10-17 PROBLEM — T78.40XA ALLERGIC REACTION: Status: ACTIVE | Noted: 2023-10-17

## 2023-10-17 PROBLEM — J01.90 ACUTE INFECTION OF NASAL SINUS: Status: ACTIVE | Noted: 2017-03-29

## 2023-10-17 PROBLEM — M25.579 ANKLE ARTHRALGIA: Status: ACTIVE | Noted: 2023-10-17

## 2023-10-17 PROBLEM — J30.2 ALLERGIC RHINITIS, SEASONAL: Status: ACTIVE | Noted: 2023-10-17

## 2023-10-17 PROBLEM — M79.646 THUMB PAIN: Status: ACTIVE | Noted: 2023-10-17

## 2023-10-17 RX ORDER — LISINOPRIL 10 MG/1
10 TABLET ORAL DAILY
Qty: 90 TABLET | Refills: 1 | Status: SHIPPED | OUTPATIENT
Start: 2023-10-17

## 2023-10-17 RX ORDER — ESCITALOPRAM OXALATE 10 MG/1
10 TABLET ORAL DAILY
Qty: 30 TABLET | Refills: 1 | Status: SHIPPED | OUTPATIENT
Start: 2023-10-17 | End: 2023-11-16

## 2023-10-17 RX ORDER — MONTELUKAST SODIUM 10 MG/1
10 TABLET ORAL NIGHTLY
Qty: 90 TABLET | Refills: 1 | Status: SHIPPED | OUTPATIENT
Start: 2023-10-17

## 2023-10-17 NOTE — ASSESSMENT & PLAN NOTE
Hypertension is improving with treatment.  Continue current treatment regimen.  Dietary sodium restriction.  Weight loss.  Regular aerobic exercise.  Continue current medications.  Blood pressure will be reassessed in 4 weeks.     Patient is currently taking lisinopril 10 mg tablet daily.  Patient denies any side effects at this time.  Patient will continue ambulatory blood pressure monitoring at home.   Blood pressure at goal on today's office visit 122/80.

## 2023-10-17 NOTE — ASSESSMENT & PLAN NOTE
Acute/unstable  Patient's depression is single episode and is mild without psychosis. Their depression is currently active and the condition is unchanged. This will be reassessed at the next regular appointment. F/U as described:patient was prescribed an antidepressant medicine.  We will start patient on Lexapro 10 mg tablets daily.  We will follow-up with patient in 6 weeks for a recheck on anxiety/depression.  Denies any suicidal ideation or harm towards others at this time.  Patient will continue counseling sessions.

## 2023-10-17 NOTE — PROGRESS NOTES
"Chief Complaint  Hypertension and healthcare maintenance  Anxiety and depression    Subjective        Nico Haynes presents to [facility]  History of Present Illness    Patient is a pleasant 47-year-old female who have seen in the office previously.  Patient is here today for a healthcare maintenance/follow-up on chronic conditions visit.  Patient is also here for an anxiety/depression visit.  Patient feels at this time she needs a medication as she is doing counseling however she has a lot of stress at work and feels she cannot handle a lot of things.  Patient denies any thoughts of suicidal ideation or harm towards others at this time.      Objective   Vital Signs:  /80   Pulse 66   Temp 97 °F (36.1 °C)   Resp 18   Ht 170 cm (66.93\")   Wt 100 kg (221 lb)   BMI 34.69 kg/m²   Estimated body mass index is 34.69 kg/m² as calculated from the following:    Height as of this encounter: 170 cm (66.93\").    Weight as of this encounter: 100 kg (221 lb).       Physical Exam  Vitals and nursing note reviewed.   Constitutional:       Appearance: Normal appearance. She is obese.   HENT:      Head: Normocephalic.      Nose: Nose normal.      Mouth/Throat:      Mouth: Mucous membranes are moist.   Eyes:      Pupils: Pupils are equal, round, and reactive to light.   Cardiovascular:      Rate and Rhythm: Normal rate and regular rhythm.      Pulses: Normal pulses.      Heart sounds: Normal heart sounds.      Comments: No peripheral edema noted.   Pulmonary:      Effort: Pulmonary effort is normal. No respiratory distress.      Breath sounds: Normal breath sounds. No stridor. No wheezing, rhonchi or rales.      Comments: Denies SOB  Chest:      Chest wall: No tenderness.   Musculoskeletal:         General: Normal range of motion.   Skin:     General: Skin is warm.      Capillary Refill: Capillary refill takes less than 2 seconds.   Neurological:      Mental Status: She is alert and " oriented to person, place, and time.   Psychiatric:         Behavior: Behavior normal.        Result Review :    Common labs          12/21/2022    12:12   Common Labs   WBC 7.43    Hemoglobin 11.7    Hematocrit 37.7    Platelets 334        Office Visit with Naz St APRN (01/20/2023)            Assessment and Plan   Diagnoses and all orders for this visit:    1. Primary hypertension (Primary)  Assessment & Plan:  Hypertension is improving with treatment.  Continue current treatment regimen.  Dietary sodium restriction.  Weight loss.  Regular aerobic exercise.  Continue current medications.  Blood pressure will be reassessed in 4 weeks.     Patient is currently taking lisinopril 10 mg tablet daily.  Patient denies any side effects at this time.  Patient will continue ambulatory blood pressure monitoring at home.   Blood pressure at goal on today's office visit 122/80.         2. Morbid (severe) obesity due to excess calories  Assessment & Plan:  Patient's (Body mass index is 34.69 kg/m².) indicates that they are obese (BMI >30) with health conditions that include hypertension . Weight is improving with lifestyle modifications. BMI  is above average; BMI management plan is completed. We discussed portion control, increasing exercise, joining a fitness center or start home based exercise program, Weight Watchers or other Commercial based weight reduction program, and an marissa-based approach such as [employer] Pal or Lose It.         3. Healthcare maintenance    4. Need for hepatitis C screening test  -     Hepatitis C Antibody    5. Screening for diabetes mellitus    6. Screening, lipid    7. Anxiety and depression  Assessment & Plan:  Acute/unstable  Patient's depression is single episode and is mild without psychosis. Their depression is currently active and the condition is unchanged. This will be reassessed at the next regular appointment. F/U as described:patient was prescribed an antidepressant  medicine.  We will start patient on Lexapro 10 mg tablets daily.  We will follow-up with patient in 6 weeks for a recheck on anxiety/depression.  Denies any suicidal ideation or harm towards others at this time.  Patient will continue counseling sessions.      8. Medication refill    Other orders  -     Tdap Vaccine => 8yo IM (BOOSTRIX)  -     escitalopram (Lexapro) 10 MG tablet; Take 1 tablet by mouth Daily for 30 days.  Dispense: 30 tablet; Refill: 1  -     lisinopril (PRINIVIL,ZESTRIL) 10 MG tablet; Take 1 tablet by mouth Daily.  Dispense: 90 tablet; Refill: 1  -     montelukast (SINGULAIR) 10 MG tablet; Take 1 tablet by mouth Every Night.  Dispense: 90 tablet; Refill: 1    We will do laboratory testing on today's office visit and contact patient with her results.  I will place patient at this time on Lexapro 10 mg tablets daily and she will follow-up via telehealth conference in 6 weeks to check if this drug is therapeutic for patient at this time.  Patient is aware if she develops any side effects of the medication she will contact the office immediately and stop the drug.       I spent 45 minutes caring for Nico on this date of service. This time includes time spent by me in the following activities:preparing for the visit, reviewing tests, obtaining and/or reviewing a separately obtained history, performing a medically appropriate examination and/or evaluation , counseling and educating the patient/family/caregiver, ordering medications, tests, or procedures, referring and communicating with other health care professionals , documenting information in the medical record, independently interpreting results and communicating that information with the patient/family/caregiver, and care coordination  Follow Up   Return in about 6 weeks (around 11/28/2023) for Video visit.  Patient was given instructions and counseling regarding her condition or for health maintenance advice. Please see specific information  pulled into the AVS if appropriate.

## 2023-10-17 NOTE — ASSESSMENT & PLAN NOTE
Patient's (Body mass index is 34.69 kg/m².) indicates that they are obese (BMI >30) with health conditions that include hypertension . Weight is improving with lifestyle modifications. BMI  is above average; BMI management plan is completed. We discussed portion control, increasing exercise, joining a fitness center or start home based exercise program, Weight Watchers or other Commercial based weight reduction program, and an marissa-based approach such as [employer] Pal or Lose It.

## 2023-10-19 ENCOUNTER — TELEMEDICINE (OUTPATIENT)
Dept: INTERNAL MEDICINE | Facility: CLINIC | Age: 48
End: 2023-10-19
Payer: COMMERCIAL

## 2023-10-19 DIAGNOSIS — E66.01 MORBID (SEVERE) OBESITY DUE TO EXCESS CALORIES: ICD-10-CM

## 2023-10-19 DIAGNOSIS — Z09 FOLLOW-UP EXAM: ICD-10-CM

## 2023-10-19 DIAGNOSIS — Z71.3 NUTRITIONAL COUNSELING: Primary | ICD-10-CM

## 2023-10-19 DIAGNOSIS — E78.2 MIXED HYPERLIPIDEMIA: ICD-10-CM

## 2023-10-19 NOTE — PROGRESS NOTES
"Chief Complaint  Hyperlipidemia/Abnormal Labs    Subjective        Nico Haynes presents to [facility]  History of Present Illness  You have chosen to receive care through a telehealth visit.  Do you consent to use a video/audio connection for your medical care today? Yes    Patient is a pleasant 47 year old female who I have seen in the past.   We are doing a video visit today due to her history of hyperlipidemia.  She is currently in [place], [place] and I am on campus in [place], [place] doing a telehealth conference.   No other problems today.      Objective   Vital Signs:  There were no vitals taken for this visit.  Estimated body mass index is 34.69 kg/m² as calculated from the following:    Height as of 10/17/23: 170 cm (66.93\").    Weight as of 10/17/23: 100 kg (221 lb).       Physical Exam  Constitutional:       Appearance: Normal appearance.   Pulmonary:      Comments: Denies SOB  Neurological:      Mental Status: She is alert and oriented to person, place, and time.   Psychiatric:         Behavior: Behavior normal.        Result Review :    Common labs          12/21/2022    12:12 10/17/2023    11:05   Common Labs   Glucose  86    BUN  13    Creatinine  0.77    Sodium  140    Potassium  4.5    Chloride  104    Calcium  9.9    Total Protein  7.3    Albumin  4.4    Total Bilirubin  0.5    Alkaline Phosphatase  94    AST (SGOT)  15    ALT (SGPT)  11    WBC 7.43  6.71    Hemoglobin 11.7  12.2    Hematocrit 37.7  38.3    Platelets 334  328    Total Cholesterol  245    Triglycerides  70    HDL Cholesterol  58    LDL Cholesterol   175    Hemoglobin A1C  4.90                   Assessment and Plan   Diagnoses and all orders for this visit:    1. Nutritional counseling (Primary)    2. Mixed hyperlipidemia  Assessment & Plan:  Chronic/Unstable   Lipid abnormalities are worsening.  Nutritional counseling was provided.  Lipids will be reassessed in 3 months.  We will see patient in 3 " months for a recheck on hyperlipidemia.   She will increase her green leafy veggies, increase exercise, and water intake.   Decrease fatty foods.       3. Follow-up exam    4. Morbid (severe) obesity due to excess calories  Assessment & Plan:  Patient's (Body mass index is 34.69 kg/m².) indicates that they are obese (BMI >30) with health conditions that include hypertension . Weight is improving with lifestyle modifications. BMI  is above average; BMI management plan is completed. We discussed portion control, increasing exercise, joining a fitness center or start home based exercise program, Weight Watchers or other Commercial based weight reduction program, and an marissa-based approach such as [employer] Pal or Lose It.              I spent 30 minutes caring for Nico on this date of service. This time includes time spent by me in the following activities:preparing for the visit, reviewing tests, obtaining and/or reviewing a separately obtained history, performing a medically appropriate examination and/or evaluation , counseling and educating the patient/family/caregiver, ordering medications, tests, or procedures, referring and communicating with other health care professionals , documenting information in the medical record, independently interpreting results and communicating that information with the patient/family/caregiver, and care coordination  Follow Up   Return for Next scheduled follow up.  Patient was given instructions and counseling regarding her condition or for health maintenance advice. Please see specific information pulled into the AVS if appropriate.

## 2023-10-19 NOTE — ASSESSMENT & PLAN NOTE
Patient's (Body mass index is 34.69 kg/m².) indicates that they are obese (BMI >30) with health conditions that include hypertension . Weight is improving with lifestyle modifications. BMI  is above average; BMI management plan is completed. We discussed portion control, increasing exercise, joining a fitness center or start home based exercise program, Weight Watchers or other Commercial based weight reduction program, and an marissa-based approach such as [employer] Pal or Lose It.

## 2023-10-19 NOTE — ASSESSMENT & PLAN NOTE
Chronic/Unstable   Lipid abnormalities are worsening.  Nutritional counseling was provided.  Lipids will be reassessed in 3 months.  We will see patient in 3 months for a recheck on hyperlipidemia.   She will increase her green leafy veggies, increase exercise, and water intake.   Decrease fatty foods.

## 2023-11-01 ENCOUNTER — TELEPHONE (OUTPATIENT)
Dept: INTERNAL MEDICINE | Facility: CLINIC | Age: 48
End: 2023-11-01
Payer: COMMERCIAL

## 2023-11-01 NOTE — TELEPHONE ENCOUNTER
This is intermittent FMLA. If she needs off for a couple days this week, please have her schedule a video visit. Thank you, Naz

## 2023-11-01 NOTE — TELEPHONE ENCOUNTER
----- Message from Nico Haynes sent at 11/1/2023  2:55 PM EDT -----  Regarding: Note  Contact: [phone]  You can call me and I have not started the process with my employer yet.

## 2023-11-01 NOTE — TELEPHONE ENCOUNTER
Called pt to get info as to why she said her depression medicine is making her very sluggish she said you told her you could write her a note to work from home a couple days a week. She said she does  not need FMLA.

## 2023-11-28 ENCOUNTER — OFFICE VISIT (OUTPATIENT)
Dept: INTERNAL MEDICINE | Facility: CLINIC | Age: 48
End: 2023-11-28
Payer: COMMERCIAL

## 2023-11-28 VITALS
RESPIRATION RATE: 18 BRPM | BODY MASS INDEX: 36.1 KG/M2 | SYSTOLIC BLOOD PRESSURE: 118 MMHG | OXYGEN SATURATION: 97 % | WEIGHT: 230 LBS | DIASTOLIC BLOOD PRESSURE: 80 MMHG | HEIGHT: 67 IN | HEART RATE: 65 BPM | TEMPERATURE: 96.9 F

## 2023-11-28 DIAGNOSIS — E78.2 MIXED HYPERLIPIDEMIA: ICD-10-CM

## 2023-11-28 DIAGNOSIS — F41.9 ANXIETY AND DEPRESSION: Primary | ICD-10-CM

## 2023-11-28 DIAGNOSIS — F32.A ANXIETY AND DEPRESSION: Primary | ICD-10-CM

## 2023-11-28 DIAGNOSIS — E66.01 MORBID (SEVERE) OBESITY DUE TO EXCESS CALORIES: ICD-10-CM

## 2023-11-28 RX ORDER — HYDROXYZINE HYDROCHLORIDE 25 MG/1
25 TABLET, FILM COATED ORAL EVERY 8 HOURS PRN
Qty: 60 TABLET | Refills: 0 | Status: SHIPPED | OUTPATIENT
Start: 2023-11-28 | End: 2023-12-28

## 2023-11-28 NOTE — ASSESSMENT & PLAN NOTE
Chronic/Unstable   Patient's depression is recurrent and is mild without psychosis. Their depression is currently active and the condition is unchanged. This will be reassessed at the next regular appointment. F/U as described:patient depression is being managed by their pcp.  Hydroxyzine 25 mg's daily/prn.   She is aware of the side effects and will take as needed while at home.

## 2023-11-28 NOTE — ASSESSMENT & PLAN NOTE
Chronic/unstable  Patient's (Body mass index is 36.1 kg/m².) indicates that they are morbidly/severely obese (BMI > 40 or > 35 with obesity - related health condition) with health conditions that include dyslipidemias . Weight is improving with treatment. BMI  is above average; BMI management plan is completed. We discussed portion control, increasing exercise, joining a fitness center or start home based exercise program, Weight Watchers or other Commercial based weight reduction program, and an marissa-based approach such as [employer] Pal or Lose It.

## 2023-11-28 NOTE — PROGRESS NOTES
"Chief Complaint  Follow-up (lexapro)    Subjective        Nico Haynes presents to [facility]  History of Present Illness    Patient is a pleasant 48-year-old female who have seen in the office previously.  Patient is here today for follow-up on chronic health conditions/anxiety/depression follow-up.  Patient has been taking Lexapro 10 mg daily and states that she developed worsening side effects and was more depressed and not herself.  Patient has been taking the medication off and on and has not taken it in 2 days.  Patient does not want be on this medication anymore.    Objective   Vital Signs:  /80   Pulse 65   Temp 96.9 °F (36.1 °C)   Resp 18   Ht 170 cm (66.93\")   Wt 104 kg (230 lb)   SpO2 97%   BMI 36.10 kg/m²   Estimated body mass index is 36.1 kg/m² as calculated from the following:    Height as of this encounter: 170 cm (66.93\").    Weight as of this encounter: 104 kg (230 lb).       Physical Exam   Result Review :    Common labs          12/21/2022    12:12 10/17/2023    11:05   Common Labs   Glucose  86    BUN  13    Creatinine  0.77    Sodium  140    Potassium  4.5    Chloride  104    Calcium  9.9    Total Protein  7.3    Albumin  4.4    Total Bilirubin  0.5    Alkaline Phosphatase  94    AST (SGOT)  15    ALT (SGPT)  11    WBC 7.43  6.71    Hemoglobin 11.7  12.2    Hematocrit 37.7  38.3    Platelets 334  328    Total Cholesterol  245    Triglycerides  70    HDL Cholesterol  58    LDL Cholesterol   175    Hemoglobin A1C  4.90                   Assessment and Plan   Diagnoses and all orders for this visit:    1. Anxiety and depression (Primary)  Assessment & Plan:  Chronic/Unstable   Patient's depression is recurrent and is mild without psychosis. Their depression is currently active and the condition is unchanged. This will be reassessed at the next regular appointment. F/U as described:patient depression is being managed by their pcp.  Hydroxyzine 25 " mg's daily/prn.   She is aware of the side effects and will take as needed while at home.       2. Mixed hyperlipidemia    3. Morbid (severe) obesity due to excess calories  Assessment & Plan:  Chronic/unstable  Patient's (Body mass index is 36.1 kg/m².) indicates that they are morbidly/severely obese (BMI > 40 or > 35 with obesity - related health condition) with health conditions that include dyslipidemias . Weight is improving with treatment. BMI  is above average; BMI management plan is completed. We discussed portion control, increasing exercise, joining a fitness center or start home based exercise program, Weight Watchers or other Commercial based weight reduction program, and an marissa-based approach such as [employer] Pal or Lose It.       Other orders  -     hydrOXYzine (ATARAX) 25 MG tablet; Take 1 tablet by mouth Every 8 (Eight) Hours As Needed for Itching or Anxiety for up to 30 days.  Dispense: 60 tablet; Refill: 0    Patient will focus diligently on active weight loss and overall increasing her healthy eating plan.  We will recheck in 8 weeks.       I spent 30 minutes caring for Nico on this date of service. This time includes time spent by me in the following activities:preparing for the visit, reviewing tests, obtaining and/or reviewing a separately obtained history, performing a medically appropriate examination and/or evaluation , counseling and educating the patient/family/caregiver, ordering medications, tests, or procedures, referring and communicating with other health care professionals , documenting information in the medical record, independently interpreting results and communicating that information with the patient/family/caregiver, and care coordination  Follow Up   Return in about 8 weeks (around 1/23/2024).  Patient was given instructions and counseling regarding her condition or for health maintenance advice. Please see specific information pulled into the AVS if appropriate.

## 2023-12-13 RX ORDER — ESCITALOPRAM OXALATE 10 MG/1
10 TABLET ORAL DAILY
Qty: 30 TABLET | Refills: 1 | Status: SHIPPED | OUTPATIENT
Start: 2023-12-13

## 2024-01-23 ENCOUNTER — OFFICE VISIT (OUTPATIENT)
Dept: INTERNAL MEDICINE | Facility: CLINIC | Age: 49
End: 2024-01-23
Payer: COMMERCIAL

## 2024-01-23 VITALS
TEMPERATURE: 96.4 F | SYSTOLIC BLOOD PRESSURE: 124 MMHG | WEIGHT: 233 LBS | DIASTOLIC BLOOD PRESSURE: 78 MMHG | OXYGEN SATURATION: 97 % | RESPIRATION RATE: 16 BRPM | HEART RATE: 74 BPM | HEIGHT: 67 IN | BODY MASS INDEX: 36.57 KG/M2

## 2024-01-23 DIAGNOSIS — F41.9 ANXIETY AND DEPRESSION: ICD-10-CM

## 2024-01-23 DIAGNOSIS — R05.1 ACUTE COUGH: Primary | ICD-10-CM

## 2024-01-23 DIAGNOSIS — F32.A ANXIETY AND DEPRESSION: ICD-10-CM

## 2024-01-23 DIAGNOSIS — Z00.00 HEALTHCARE MAINTENANCE: ICD-10-CM

## 2024-01-23 DIAGNOSIS — E66.01 MORBID (SEVERE) OBESITY DUE TO EXCESS CALORIES: ICD-10-CM

## 2024-01-23 DIAGNOSIS — E78.2 MIXED HYPERLIPIDEMIA: ICD-10-CM

## 2024-01-23 LAB
EXPIRATION DATE: NORMAL
FLUAV AG UPPER RESP QL IA.RAPID: NOT DETECTED
FLUBV AG UPPER RESP QL IA.RAPID: NOT DETECTED
INTERNAL CONTROL: NORMAL
Lab: NORMAL
SARS-COV-2 AG UPPER RESP QL IA.RAPID: NOT DETECTED

## 2024-01-23 RX ORDER — HYDROXYZINE HYDROCHLORIDE 25 MG/1
25 TABLET, FILM COATED ORAL EVERY 8 HOURS PRN
COMMUNITY

## 2024-01-23 NOTE — PROGRESS NOTES
"Chief Complaint  Anxiety (Follow up/) and Cough (Congestion, cough, headache/Since Thursday)    Subjective        Nico Haynes presents to [facility]  History of Present Illness    Patient is a pleasant 48-year-old female who I have seen in the office previously.  Patient is here today for an anxiety follow-up and for her history of cough congestion with mild headache since Thursday.    No history of thyroid cancer in her health history or family history.    Objective   Vital Signs:  /78   Pulse 74   Temp 96.4 °F (35.8 °C)   Resp 16   Ht 170 cm (66.93\")   Wt 106 kg (233 lb)   SpO2 97%   BMI 36.57 kg/m²   Estimated body mass index is 36.57 kg/m² as calculated from the following:    Height as of this encounter: 170 cm (66.93\").    Weight as of this encounter: 106 kg (233 lb).       Physical Exam  Vitals and nursing note reviewed.   Constitutional:       Appearance: Normal appearance. She is obese.   HENT:      Head: Normocephalic.      Nose: Congestion present.      Comments: Complains of cough with congestion x 6 days     Mouth/Throat:      Mouth: Mucous membranes are moist.      Pharynx: Oropharynx is clear. No oropharyngeal exudate or posterior oropharyngeal erythema.   Eyes:      Pupils: Pupils are equal, round, and reactive to light.   Cardiovascular:      Rate and Rhythm: Normal rate and regular rhythm.      Pulses: Normal pulses.      Heart sounds: Normal heart sounds.   Pulmonary:      Effort: Pulmonary effort is normal.      Breath sounds: Normal breath sounds.      Comments: Denies shortness of breath  Musculoskeletal:         General: Normal range of motion.   Skin:     General: Skin is warm.      Capillary Refill: Capillary refill takes less than 2 seconds.   Neurological:      Mental Status: She is alert and oriented to person, place, and time.   Psychiatric:         Behavior: Behavior normal.        Result Review :      Common labs          10/17/2023 "    11:05 1/23/2024    11:27   Common Labs   Glucose 86  80    BUN 13  11    Creatinine 0.77  0.79    Sodium 140  139    Potassium 4.5  4.7    Chloride 104  104    Calcium 9.9  9.4    Total Protein 7.3  6.8    Albumin 4.4  4.0    Total Bilirubin 0.5  0.5    Alkaline Phosphatase 94  102    AST (SGOT) 15  12    ALT (SGPT) 11  13    WBC 6.71  6.85    Hemoglobin 12.2  11.5    Hematocrit 38.3  34.9    Platelets 328  372    Total Cholesterol 245  249    Triglycerides 70  83    HDL Cholesterol 58  59    LDL Cholesterol  175  176    Hemoglobin A1C 4.90  4.50                   Assessment and Plan     Diagnoses and all orders for this visit:    1. Acute cough (Primary)  -     POCT SARS-CoV-2 Antigen SOFIA + Flu  -     CBC & Differential  -     Comprehensive Metabolic Panel  -     Hemoglobin A1c  -     Lipid Panel    2. Anxiety and depression  -     CBC & Differential  -     Comprehensive Metabolic Panel  -     Hemoglobin A1c  -     Lipid Panel    3. Mixed hyperlipidemia  Assessment & Plan:  Chronic/Unstable   Lipid abnormalities are worsening.  Nutritional counseling was provided.  Lipids will be reassessed in 3 months.  We will see patient in 3 months for a recheck on hyperlipidemia.   She will increase her green leafy veggies, increase exercise, and water intake.   Decrease fatty foods.    Total cholesterol as of October 2023 is high at 245 and bad cholesterol/LDL at 175.  Nutritional counseling has been provided.  Will recheck lipid panel on today's office visit.    Orders:  -     CBC & Differential  -     Comprehensive Metabolic Panel  -     Hemoglobin A1c  -     Lipid Panel    4. Healthcare maintenance  -     CBC & Differential  -     Comprehensive Metabolic Panel  -     Hemoglobin A1c  -     Lipid Panel    5. Morbid (severe) obesity due to excess calories    Other orders  -     Semaglutide-Weight Management 0.25 MG/0.5ML solution auto-injector; Inject 0.25 mg under the skin into the appropriate area as directed 1 (One)  Time Per Week for 4 doses.  Dispense: 2 mL; Refill: 0    Follow-up in 4 weeks after starting Wegovy.  We will contact patient with her lab results.     I spent 35 minutes caring for Nico on this date of service. This time includes time spent by me in the following activities:preparing for the visit, reviewing tests, obtaining and/or reviewing a separately obtained history, performing a medically appropriate examination and/or evaluation , counseling and educating the patient/family/caregiver, ordering medications, tests, or procedures, referring and communicating with other health care professionals , documenting information in the medical record, independently interpreting results and communicating that information with the patient/family/caregiver, and care coordination  Follow Up     Return in about 6 weeks (around 3/5/2024) for Video visit.  Patient was given instructions and counseling regarding her condition or for health maintenance advice. Please see specific information pulled into the AVS if appropriate.

## 2024-01-24 LAB
ALBUMIN SERPL-MCNC: 4 G/DL (ref 3.5–5.2)
ALBUMIN/GLOB SERPL: 1.4 G/DL
ALP SERPL-CCNC: 102 U/L (ref 39–117)
ALT SERPL-CCNC: 13 U/L (ref 1–33)
AST SERPL-CCNC: 12 U/L (ref 1–32)
BASOPHILS # BLD AUTO: 0.02 10*3/MM3 (ref 0–0.2)
BASOPHILS NFR BLD AUTO: 0.3 % (ref 0–1.5)
BILIRUB SERPL-MCNC: 0.5 MG/DL (ref 0–1.2)
BUN SERPL-MCNC: 11 MG/DL (ref 6–20)
BUN/CREAT SERPL: 13.9 (ref 7–25)
CALCIUM SERPL-MCNC: 9.4 MG/DL (ref 8.6–10.5)
CHLORIDE SERPL-SCNC: 104 MMOL/L (ref 98–107)
CHOLEST SERPL-MCNC: 249 MG/DL (ref 0–200)
CO2 SERPL-SCNC: 26.9 MMOL/L (ref 22–29)
CREAT SERPL-MCNC: 0.79 MG/DL (ref 0.57–1)
EGFRCR SERPLBLD CKD-EPI 2021: 93.6 ML/MIN/1.73
EOSINOPHIL # BLD AUTO: 0.1 10*3/MM3 (ref 0–0.4)
EOSINOPHIL NFR BLD AUTO: 1.5 % (ref 0.3–6.2)
ERYTHROCYTE [DISTWIDTH] IN BLOOD BY AUTOMATED COUNT: 11.5 % (ref 12.3–15.4)
GLOBULIN SER CALC-MCNC: 2.8 GM/DL
GLUCOSE SERPL-MCNC: 80 MG/DL (ref 65–99)
HBA1C MFR BLD: 4.5 % (ref 4.8–5.6)
HCT VFR BLD AUTO: 34.9 % (ref 34–46.6)
HDLC SERPL-MCNC: 59 MG/DL (ref 40–60)
HGB BLD-MCNC: 11.5 G/DL (ref 12–15.9)
IMM GRANULOCYTES # BLD AUTO: 0.03 10*3/MM3 (ref 0–0.05)
IMM GRANULOCYTES NFR BLD AUTO: 0.4 % (ref 0–0.5)
LDLC SERPL CALC-MCNC: 176 MG/DL (ref 0–100)
LYMPHOCYTES # BLD AUTO: 2.25 10*3/MM3 (ref 0.7–3.1)
LYMPHOCYTES NFR BLD AUTO: 32.8 % (ref 19.6–45.3)
MCH RBC QN AUTO: 29 PG (ref 26.6–33)
MCHC RBC AUTO-ENTMCNC: 33 G/DL (ref 31.5–35.7)
MCV RBC AUTO: 87.9 FL (ref 79–97)
MONOCYTES # BLD AUTO: 0.34 10*3/MM3 (ref 0.1–0.9)
MONOCYTES NFR BLD AUTO: 5 % (ref 5–12)
NEUTROPHILS # BLD AUTO: 4.11 10*3/MM3 (ref 1.7–7)
NEUTROPHILS NFR BLD AUTO: 60 % (ref 42.7–76)
NRBC BLD AUTO-RTO: 0 /100 WBC (ref 0–0.2)
PLATELET # BLD AUTO: 372 10*3/MM3 (ref 140–450)
POTASSIUM SERPL-SCNC: 4.7 MMOL/L (ref 3.5–5.2)
PROT SERPL-MCNC: 6.8 G/DL (ref 6–8.5)
RBC # BLD AUTO: 3.97 10*6/MM3 (ref 3.77–5.28)
SODIUM SERPL-SCNC: 139 MMOL/L (ref 136–145)
TRIGL SERPL-MCNC: 83 MG/DL (ref 0–150)
VLDLC SERPL CALC-MCNC: 14 MG/DL (ref 5–40)
WBC # BLD AUTO: 6.85 10*3/MM3 (ref 3.4–10.8)

## 2024-01-24 NOTE — PROGRESS NOTES
All labs are normal, except your lipid panel.   3 months for a follow up on hyperlipidemia. Take care, Naz

## 2024-01-30 NOTE — ASSESSMENT & PLAN NOTE
Chronic/Unstable   Lipid abnormalities are worsening.  Nutritional counseling was provided.  Lipids will be reassessed in 3 months.  We will see patient in 3 months for a recheck on hyperlipidemia.   She will increase her green leafy veggies, increase exercise, and water intake.   Decrease fatty foods.    Total cholesterol as of October 2023 is high at 245 and bad cholesterol/LDL at 175.  Nutritional counseling has been provided.  Will recheck lipid panel on today's office visit.

## 2024-02-23 ENCOUNTER — PATIENT MESSAGE (OUTPATIENT)
Dept: INTERNAL MEDICINE | Facility: CLINIC | Age: 49
End: 2024-02-23
Payer: COMMERCIAL

## 2024-03-05 ENCOUNTER — TELEMEDICINE (OUTPATIENT)
Dept: INTERNAL MEDICINE | Facility: CLINIC | Age: 49
End: 2024-03-05
Payer: COMMERCIAL

## 2024-03-05 ENCOUNTER — DOCUMENTATION (OUTPATIENT)
Dept: INTERNAL MEDICINE | Facility: CLINIC | Age: 49
End: 2024-03-05

## 2024-03-05 VITALS — BODY MASS INDEX: 35.16 KG/M2 | WEIGHT: 224 LBS

## 2024-03-05 DIAGNOSIS — E66.01 MORBID (SEVERE) OBESITY DUE TO EXCESS CALORIES: ICD-10-CM

## 2024-03-05 DIAGNOSIS — F32.A ANXIETY AND DEPRESSION: Primary | ICD-10-CM

## 2024-03-05 DIAGNOSIS — F41.9 ANXIETY AND DEPRESSION: Primary | ICD-10-CM

## 2024-03-05 DIAGNOSIS — Z00.00 HEALTHCARE MAINTENANCE: ICD-10-CM

## 2024-03-05 DIAGNOSIS — E78.2 MIXED HYPERLIPIDEMIA: ICD-10-CM

## 2024-03-05 DIAGNOSIS — I10 PRIMARY HYPERTENSION: ICD-10-CM

## 2024-03-05 NOTE — PROGRESS NOTES
"Chief Complaint  Obesity follow-up    Subjective        Nico Haynes presents to [facility]  History of Present Illness    You have chosen to receive care through a telehealth visit.  Do you consent to use a video/audio connection for your medical care today? Yes    Patient is currently at her home in [facility] and I am currently on campus in [place] [facility] doing a telehealth conference today to discuss semaglutide treatment for obesity.    Mother was just diagnosed with Covid/Pneumonia and she was hospitalized.   Her mother fell and broke her shoulder due to weakness and fatigue.     OB for papsmear tomorrow.     She has lost 10 pounds since Jan.     She is doing IF (protein and veggies)     PA is needed for Wegovy.     Objective   Vital Signs:  Wt 102 kg (224 lb)   BMI 35.16 kg/m²   Estimated body mass index is 35.16 kg/m² as calculated from the following:    Height as of 1/23/24: 170 cm (66.93\").    Weight as of this encounter: 102 kg (224 lb).              Physical Exam  Constitutional:       Appearance: Normal appearance. She is obese.   Pulmonary:      Comments: Denies shortness of breath  Neurological:      Mental Status: She is alert and oriented to person, place, and time.   Psychiatric:         Behavior: Behavior normal.        Result Review :      Common labs          10/17/2023    11:05 1/23/2024    11:27   Common Labs   Glucose 86  80    BUN 13  11    Creatinine 0.77  0.79    Sodium 140  139    Potassium 4.5  4.7    Chloride 104  104    Calcium 9.9  9.4    Total Protein 7.3  6.8    Albumin 4.4  4.0    Total Bilirubin 0.5  0.5    Alkaline Phosphatase 94  102    AST (SGOT) 15  12    ALT (SGPT) 11  13    WBC 6.71  6.85    Hemoglobin 12.2  11.5    Hematocrit 38.3  34.9    Platelets 328  372    Total Cholesterol 245  249    Triglycerides 70  83    HDL Cholesterol 58  59    LDL Cholesterol  175  176    Hemoglobin A1C 4.90  4.50                 "   Assessment and Plan     Diagnoses and all orders for this visit:    1. Anxiety and depression (Primary)    2. Mixed hyperlipidemia    3. Primary hypertension  Assessment & Plan:  Chronic/stable  Hypertension is improving with treatment.  Continue current treatment regimen.  Dietary sodium restriction.  Weight loss.  Regular aerobic exercise.  Continue current medications.  Blood pressure will be reassessed on next office visit..     Patient is currently taking lisinopril 10 mg tablet daily.  Patient denies any side effects at this time.  Patient will continue ambulatory blood pressure monitoring at home.         4. Morbid (severe) obesity due to excess calories  Assessment & Plan:  Chronic/improving  Patient's (Body mass index is 35.16 kg/m².) indicates that they are morbidly/severely obese (BMI > 40 or > 35 with obesity - related health condition) with health conditions that include hypertension and dyslipidemias . Weight is improving with treatment. BMI  is above average; BMI management plan is completed. We discussed portion control, increasing exercise, joining a fitness center or start home based exercise program, Weight Watchers or other Commercial based weight reduction program, pharmacologic options including semaglutide, and an marissa-based approach such as [employer] Pal or Lose It.       5. Healthcare maintenance           I spent 35 minutes caring for Nico on this date of service. This time includes time spent by me in the following activities:preparing for the visit, reviewing tests, obtaining and/or reviewing a separately obtained history, performing a medically appropriate examination and/or evaluation , counseling and educating the patient/family/caregiver, ordering medications, tests, or procedures, referring and communicating with other health care professionals , documenting information in the medical record, independently interpreting results and communicating that information with the  patient/family/caregiver, and care coordination  Follow Up     Return in about 4 weeks (around 4/2/2024) for Annual physical.  Patient was given instructions and counseling regarding her condition or for health maintenance advice. Please see specific information pulled into the AVS if appropriate.

## 2024-03-05 NOTE — PROGRESS NOTES
PA DENIED ON WEGOVY NO COVERAGE ON [employer]ESITY DRUGS. PT REQUESTING TO TRY [facility].

## 2024-03-27 NOTE — ASSESSMENT & PLAN NOTE
Chronic/stable  Hypertension is improving with treatment.  Continue current treatment regimen.  Dietary sodium restriction.  Weight loss.  Regular aerobic exercise.  Continue current medications.  Blood pressure will be reassessed on next office visit..     Patient is currently taking lisinopril 10 mg tablet daily.  Patient denies any side effects at this time.  Patient will continue ambulatory blood pressure monitoring at home.

## 2024-03-27 NOTE — ASSESSMENT & PLAN NOTE
Chronic/improving  Patient's (Body mass index is 35.16 kg/m².) indicates that they are morbidly/severely obese (BMI > 40 or > 35 with obesity - related health condition) with health conditions that include hypertension and dyslipidemias . Weight is improving with treatment. BMI  is above average; BMI management plan is completed. We discussed portion control, increasing exercise, joining a fitness center or start home based exercise program, Weight Watchers or other Commercial based weight reduction program, pharmacologic options including semaglutide, and an marissa-based approach such as [employer] Pal or Lose It.

## 2024-04-05 ENCOUNTER — OFFICE VISIT (OUTPATIENT)
Dept: INTERNAL MEDICINE | Facility: CLINIC | Age: 49
End: 2024-04-05
Payer: COMMERCIAL

## 2024-04-05 VITALS
HEIGHT: 67 IN | WEIGHT: 233 LBS | DIASTOLIC BLOOD PRESSURE: 80 MMHG | HEART RATE: 80 BPM | BODY MASS INDEX: 36.57 KG/M2 | SYSTOLIC BLOOD PRESSURE: 122 MMHG | TEMPERATURE: 96.4 F | RESPIRATION RATE: 18 BRPM | OXYGEN SATURATION: 96 %

## 2024-04-05 DIAGNOSIS — Z00.00 ANNUAL PHYSICAL EXAM: ICD-10-CM

## 2024-04-05 DIAGNOSIS — Z13.220 SCREENING, LIPID: ICD-10-CM

## 2024-04-05 DIAGNOSIS — Z71.3 NUTRITIONAL COUNSELING: Primary | ICD-10-CM

## 2024-04-05 DIAGNOSIS — I10 PRIMARY HYPERTENSION: ICD-10-CM

## 2024-04-05 DIAGNOSIS — Z13.1 SCREENING FOR DIABETES MELLITUS: ICD-10-CM

## 2024-04-05 DIAGNOSIS — E66.01 MORBID (SEVERE) OBESITY DUE TO EXCESS CALORIES: ICD-10-CM

## 2024-04-05 LAB
ALBUMIN SERPL-MCNC: 4.1 G/DL (ref 3.5–5.2)
ALBUMIN/GLOB SERPL: 1.5 G/DL
ALP SERPL-CCNC: 102 U/L (ref 39–117)
ALT SERPL-CCNC: 15 U/L (ref 1–33)
AST SERPL-CCNC: 16 U/L (ref 1–32)
BASOPHILS # BLD AUTO: 0.03 10*3/MM3 (ref 0–0.2)
BASOPHILS NFR BLD AUTO: 0.5 % (ref 0–1.5)
BILIRUB SERPL-MCNC: 0.5 MG/DL (ref 0–1.2)
BUN SERPL-MCNC: 11 MG/DL (ref 6–20)
BUN/CREAT SERPL: 14.9 (ref 7–25)
CALCIUM SERPL-MCNC: 9.4 MG/DL (ref 8.6–10.5)
CHLORIDE SERPL-SCNC: 106 MMOL/L (ref 98–107)
CHOLEST SERPL-MCNC: 245 MG/DL (ref 0–200)
CO2 SERPL-SCNC: 26.4 MMOL/L (ref 22–29)
CREAT SERPL-MCNC: 0.74 MG/DL (ref 0.57–1)
EGFRCR SERPLBLD CKD-EPI 2021: 101.2 ML/MIN/1.73
EOSINOPHIL # BLD AUTO: 0.1 10*3/MM3 (ref 0–0.4)
EOSINOPHIL NFR BLD AUTO: 1.8 % (ref 0.3–6.2)
ERYTHROCYTE [DISTWIDTH] IN BLOOD BY AUTOMATED COUNT: 11.9 % (ref 12.3–15.4)
GLOBULIN SER CALC-MCNC: 2.8 GM/DL
GLUCOSE SERPL-MCNC: 90 MG/DL (ref 65–99)
HBA1C MFR BLD: 4.5 % (ref 4.8–5.6)
HCT VFR BLD AUTO: 36.5 % (ref 34–46.6)
HDLC SERPL-MCNC: 53 MG/DL (ref 40–60)
HGB BLD-MCNC: 11.5 G/DL (ref 12–15.9)
IMM GRANULOCYTES # BLD AUTO: 0.02 10*3/MM3 (ref 0–0.05)
IMM GRANULOCYTES NFR BLD AUTO: 0.4 % (ref 0–0.5)
LDLC SERPL CALC-MCNC: 178 MG/DL (ref 0–100)
LYMPHOCYTES # BLD AUTO: 2.1 10*3/MM3 (ref 0.7–3.1)
LYMPHOCYTES NFR BLD AUTO: 37.6 % (ref 19.6–45.3)
MCH RBC QN AUTO: 28.2 PG (ref 26.6–33)
MCHC RBC AUTO-ENTMCNC: 31.5 G/DL (ref 31.5–35.7)
MCV RBC AUTO: 89.5 FL (ref 79–97)
MONOCYTES # BLD AUTO: 0.32 10*3/MM3 (ref 0.1–0.9)
MONOCYTES NFR BLD AUTO: 5.7 % (ref 5–12)
NEUTROPHILS # BLD AUTO: 3.01 10*3/MM3 (ref 1.7–7)
NEUTROPHILS NFR BLD AUTO: 54 % (ref 42.7–76)
NRBC BLD AUTO-RTO: 0 /100 WBC (ref 0–0.2)
PLATELET # BLD AUTO: 352 10*3/MM3 (ref 140–450)
POTASSIUM SERPL-SCNC: 4.9 MMOL/L (ref 3.5–5.2)
PROT SERPL-MCNC: 6.9 G/DL (ref 6–8.5)
RBC # BLD AUTO: 4.08 10*6/MM3 (ref 3.77–5.28)
SODIUM SERPL-SCNC: 139 MMOL/L (ref 136–145)
TRIGL SERPL-MCNC: 83 MG/DL (ref 0–150)
VIT B12 SERPL-MCNC: 396 PG/ML (ref 211–946)
VLDLC SERPL CALC-MCNC: 14 MG/DL (ref 5–40)
WBC # BLD AUTO: 5.58 10*3/MM3 (ref 3.4–10.8)

## 2024-04-09 NOTE — PROGRESS NOTES
We need to decrease fats (saturated fats) in diet.   All other labs are normal. Naz  We should follow up in 3 months.

## 2024-04-19 NOTE — ASSESSMENT & PLAN NOTE
Chronic/unstable  Patient's (Body mass index is 36.57 kg/m².) indicates that they are morbidly/severely obese (BMI > 40 or > 35 with obesity - related health condition) with health conditions that include hypertension and dyslipidemias . Weight is worsening. BMI  is above average; BMI management plan is completed. We discussed portion control, increasing exercise, joining a fitness center or start home based exercise program, Weight Watchers or other Commercial based weight reduction program, and an marissa-based approach such as [employer] Pal or Lose It    Semaglutide start we will follow-up in 4 weeks.

## 2024-04-19 NOTE — ASSESSMENT & PLAN NOTE
Chronic/stable  Hypertension is improving with treatment.  Continue current treatment regimen.  Dietary sodium restriction.  Weight loss.  Regular aerobic exercise.  Continue current medications.  Blood pressure will be reassessed on next office visit..     Patient is currently taking lisinopril 10 mg tablet daily.  Patient denies any side effects at this time.  Patient will continue ambulatory blood pressure monitoring at home.  Blood pressure normal on today's office visit/at goal 122/80.

## 2024-04-22 RX ORDER — LISINOPRIL 10 MG/1
10 TABLET ORAL DAILY
Qty: 90 TABLET | Refills: 1 | Status: SHIPPED | OUTPATIENT
Start: 2024-04-22

## 2024-05-15 ENCOUNTER — TELEMEDICINE (OUTPATIENT)
Dept: INTERNAL MEDICINE | Facility: CLINIC | Age: 49
End: 2024-05-15
Payer: COMMERCIAL

## 2024-05-15 VITALS — WEIGHT: 224 LBS | BODY MASS INDEX: 35.16 KG/M2

## 2024-05-15 DIAGNOSIS — K59.00 CONSTIPATION, UNSPECIFIED CONSTIPATION TYPE: ICD-10-CM

## 2024-05-15 DIAGNOSIS — E66.01 MORBID (SEVERE) OBESITY DUE TO EXCESS CALORIES: Primary | ICD-10-CM

## 2024-05-15 DIAGNOSIS — I10 PRIMARY HYPERTENSION: ICD-10-CM

## 2024-05-15 DIAGNOSIS — Z71.3 NUTRITIONAL COUNSELING: ICD-10-CM

## 2024-05-15 DIAGNOSIS — Z09 FOLLOW-UP EXAM: ICD-10-CM

## 2024-05-15 PROCEDURE — 99214 OFFICE O/P EST MOD 30 MIN: CPT | Performed by: NURSE PRACTITIONER

## 2024-05-15 RX ORDER — LISINOPRIL 10 MG/1
10 TABLET ORAL DAILY
Qty: 90 TABLET | Refills: 1 | Status: SHIPPED | OUTPATIENT
Start: 2024-05-15

## 2024-05-15 NOTE — ASSESSMENT & PLAN NOTE
Chronic/stable  Hypertension is improving with treatment.  Continue current treatment regimen.  Dietary sodium restriction.  Weight loss.  Regular aerobic exercise.  Continue current medications.  Blood pressure will be reassessed on next office visit..     Patient is currently taking lisinopril 10 mg tablet daily.  Patient denies any side effects at this time.  Patient will continue ambulatory blood pressure monitoring at home.  Refill sent in today as she requested.   
Chronic/unstable  Patient's (Body mass index is 35.16 kg/m².) indicates that they are morbidly/severely obese (BMI > 40 or > 35 with obesity - related health condition) with health conditions that include hypertension and dyslipidemias . Weight is improving with treatment. BMI  is above average; BMI management plan is completed. We discussed portion control, increasing exercise, joining a fitness center or start home based exercise program, and pharmacologic options including semaglutide .     Patient is doing very well at this time with 0.25 mg of Wegovy every Tuesday at 9 AM.  Patient's side effect profile is constipation only.  Patient states at this time she is only eating 1 meal a day.  She will increase her intake of good healthy foods to smaller more frequent portions throughout the day, increase water intake and I believe this will improve.   
Patient

## 2024-05-15 NOTE — PROGRESS NOTES
"Chief Complaint  Obesity Follow UP    Subjective        Nico Haynes presents to [facility]  History of Present Illness    You have chosen to receive care through a telehealth visit.  Do you consent to use a video/audio connection for your medical care today? Yes    Patient is currently at her home in [facility] and I am currently on campus in [facility] doing a telehealth conference today.  We are doing an obesity follow-up at this time.  No other problems on today's office visit.    She has been eating salad and veggies and baked chicken.     She is currently walking as well every day.     She has lost 10 pounds since starting Semaglutide on 04/2023.     When she eats she has to stop and she is full.   She started taking medication on 04/23/2024.  She has been taking 0.25 mg's.     Constipation- ([employer] cup (3 cups of water daily).     Objective   Vital Signs:  Wt 102 kg (224 lb)   BMI 35.16 kg/m²   Estimated body mass index is 35.16 kg/m² as calculated from the following:    Height as of 4/5/24: 170 cm (66.93\").    Weight as of this encounter: 102 kg (224 lb).           Physical Exam  Constitutional:       Appearance: Normal appearance. She is obese.   Pulmonary:      Comments: Denies SOB  Musculoskeletal:         General: Normal range of motion.   Neurological:      Mental Status: She is alert and oriented to person, place, and time.   Psychiatric:         Behavior: Behavior normal.        Result Review :      Common labs          10/17/2023    11:05 1/23/2024    11:27 4/5/2024    10:30   Common Labs   Glucose 86  80  90    BUN 13  11  11    Creatinine 0.77  0.79  0.74    Sodium 140  139  139    Potassium 4.5  4.7  4.9    Chloride 104  104  106    Calcium 9.9  9.4  9.4    Total Protein 7.3  6.8  6.9    Albumin 4.4  4.0  4.1    Total Bilirubin 0.5  0.5  0.5    Alkaline Phosphatase 94  102  102    AST (SGOT) 15  12  16    ALT (SGPT) 11  13  15    WBC " 6.71  6.85  5.58    Hemoglobin 12.2  11.5  11.5    Hematocrit 38.3  34.9  36.5    Platelets 328  372  352    Total Cholesterol 245  249  245    Triglycerides 70  83  83    HDL Cholesterol 58  59  53    LDL Cholesterol  175  176  178    Hemoglobin A1C 4.90  4.50  4.50        Office Visit with Naz St APRN (04/05/2024)            Assessment and Plan     Diagnoses and all orders for this visit:    1. Morbid (severe) obesity due to excess calories (Primary)  Assessment & Plan:  Chronic/unstable  Patient's (Body mass index is 35.16 kg/m².) indicates that they are morbidly/severely obese (BMI > 40 or > 35 with obesity - related health condition) with health conditions that include hypertension and dyslipidemias . Weight is improving with treatment. BMI  is above average; BMI management plan is completed. We discussed portion control, increasing exercise, joining a fitness center or start home based exercise program, and pharmacologic options including semaglutide .     Patient is doing very well at this time with 0.25 mg of Wegovy every Tuesday at 9 AM.  Patient's side effect profile is constipation only.  Patient states at this time she is only eating 1 meal a day.  She will increase her intake of good healthy foods to smaller more frequent portions throughout the day, increase water intake and I believe this will improve.       2. Follow-up exam    3. Nutritional counseling    4. Constipation, unspecified constipation type    5. Primary hypertension  Assessment & Plan:  Chronic/stable  Hypertension is improving with treatment.  Continue current treatment regimen.  Dietary sodium restriction.  Weight loss.  Regular aerobic exercise.  Continue current medications.  Blood pressure will be reassessed on next office visit..     Patient is currently taking lisinopril 10 mg tablet daily.  Patient denies any side effects at this time.  Patient will continue ambulatory blood pressure monitoring at home.  Refill sent in  today as she requested.       Other orders  -     lisinopril (PRINIVIL,ZESTRIL) 10 MG tablet; Take 1 tablet by mouth Daily.  Dispense: 90 tablet; Refill: 1           I spent 30 minutes caring for Nico on this date of service. This time includes time spent by me in the following activities:preparing for the visit, reviewing tests, obtaining and/or reviewing a separately obtained history, performing a medically appropriate examination and/or evaluation , counseling and educating the patient/family/caregiver, ordering medications, tests, or procedures, referring and communicating with other health care professionals , documenting information in the medical record, independently interpreting results and communicating that information with the patient/family/caregiver, and care coordination  Follow Up     Return in about 5 weeks (around 6/21/2024) for Recheck, Video visit.  Patient was given instructions and counseling regarding her condition or for health maintenance advice. Please see specific information pulled into the AVS if appropriate.

## 2024-05-28 RX ORDER — LISINOPRIL 10 MG/1
10 TABLET ORAL DAILY
Qty: 90 TABLET | Refills: 1 | OUTPATIENT
Start: 2024-05-28

## 2024-05-29 RX ORDER — LISINOPRIL 10 MG/1
10 TABLET ORAL DAILY
Qty: 90 TABLET | Refills: 1 | OUTPATIENT
Start: 2024-05-29

## 2024-06-20 ENCOUNTER — TELEMEDICINE (OUTPATIENT)
Dept: INTERNAL MEDICINE | Facility: CLINIC | Age: 49
End: 2024-06-20
Payer: COMMERCIAL

## 2024-06-20 VITALS — WEIGHT: 221 LBS | BODY MASS INDEX: 34.69 KG/M2

## 2024-06-20 DIAGNOSIS — E66.01 MORBID (SEVERE) OBESITY DUE TO EXCESS CALORIES: ICD-10-CM

## 2024-06-20 DIAGNOSIS — E78.2 MIXED HYPERLIPIDEMIA: Primary | ICD-10-CM

## 2024-06-20 DIAGNOSIS — Z71.3 NUTRITIONAL COUNSELING: ICD-10-CM

## 2024-06-20 DIAGNOSIS — I10 PRIMARY HYPERTENSION: ICD-10-CM

## 2024-06-20 PROCEDURE — 99214 OFFICE O/P EST MOD 30 MIN: CPT | Performed by: NURSE PRACTITIONER

## 2024-06-20 NOTE — PROGRESS NOTES
"Chief Complaint  Obesity Follow Up    Subjective        Nico Haynes presents to [facility]  History of Present Illness  History of Present Illness  The patient presents via virtual visit for evaluation of multiple medical concerns.    The patient reports a weight loss of 3 pounds over the past 2 weeks, with her current weight being 221 pounds. She believes her current medication, semaglutide, has been effective in managing her weight. Despite contacting the pharmacy, she was informed that her dosage could not be increased until a different prescription was received from me. She is currently on a regimen of semaglutide 0.25 mg, administered on Tuesdays/weekly. She is planning a vacation next Wednesday and is inquiring about the feasibility of waiting for her semaglutide dose upon her return from a cruise.    The patient has been without her blood pressure medication for approximately 3 weeks and is in need of a refill. She has been monitoring her blood pressure at home using a cuff. It has been normal.     Supplemental Information  She is mentally exhausted due to her mother's recent fall.     You have chosen to receive care through a telehealth visit.  Do you consent to use a video/audio connection for your medical care today? Yes    Patient is at her home in [place], [place] and I am on campus in [place].     Objective   Vital Signs:  Wt 100 kg (221 lb)   BMI 34.69 kg/m²   Estimated body mass index is 34.69 kg/m² as calculated from the following:    Height as of 4/5/24: 170 cm (66.93\").    Weight as of this encounter: 100 kg (221 lb).               Physical Exam  Constitutional:       Appearance: Normal appearance.   Pulmonary:      Comments: Denies SOB  Neurological:      Mental Status: She is alert and oriented to person, place, and time.   Psychiatric:         Behavior: Behavior normal.        Physical Exam       Result Review :      Common labs          10/17/2023 "    11:05 1/23/2024    11:27 4/5/2024    10:30   Common Labs   Glucose 86  80  90    BUN 13  11  11    Creatinine 0.77  0.79  0.74    Sodium 140  139  139    Potassium 4.5  4.7  4.9    Chloride 104  104  106    Calcium 9.9  9.4  9.4    Total Protein 7.3  6.8  6.9    Albumin 4.4  4.0  4.1    Total Bilirubin 0.5  0.5  0.5    Alkaline Phosphatase 94  102  102    AST (SGOT) 15  12  16    ALT (SGPT) 11  13  15    WBC 6.71  6.85  5.58    Hemoglobin 12.2  11.5  11.5    Hematocrit 38.3  34.9  36.5    Platelets 328  372  352    Total Cholesterol 245  249  245    Triglycerides 70  83  83    HDL Cholesterol 58  59  53    LDL Cholesterol  175  176  178    Hemoglobin A1C 4.90  4.50  4.50        Results  Laboratory Studies  A1c is wonderful.              Assessment and Plan     Diagnoses and all orders for this visit:    1. Mixed hyperlipidemia (Primary)    2. Morbid (severe) obesity due to excess calories    3. Primary hypertension    4. Nutritional counseling      Assessment & Plan  1. Weight management.  The patient's body mass index has decreased to 34.69, with the aim of achieving it below 30. The patient's semaglutide dosage will be escalated to 0.5 mg, with one vial provided. Upon her return from a cruise, she will commence her semaglutide regimen on the same day. She has been advised to consume smaller, more frequent meals, ample water intake, and engage in weight lifting. Upon her return from vacation, she will inform us of her semaglutide dosage, at which point the dosage will be increased to 1 mg.    2. Hypertension.  The patient's stability of hypertension is likely a side effect of her medication and weight loss. The patient has been advised to half her lisinopril dosage and maintain her current dosage of 5 mg. She has been advised to monitor her blood pressure regularly. Should her blood pressure rise, she is advised to resume her lisinopril regimen.    Follow-up  The patient is scheduled for a follow-up visit at the  end of 07/2024, during which her labs will be conducted.         I spent 35 minutes caring for Nico on this date of service. This time includes time spent by me in the following activities:preparing for the visit, reviewing tests, obtaining and/or reviewing a separately obtained history, performing a medically appropriate examination and/or evaluation , counseling and educating the patient/family/caregiver, ordering medications, tests, or procedures, referring and communicating with other health care professionals , documenting information in the medical record, independently interpreting results and communicating that information with the patient/family/caregiver, and care coordination  Follow Up     Return in about 6 weeks (around 7/30/2024) for Recheck.  Patient was given instructions and counseling regarding her condition or for health maintenance advice. Please see specific information pulled into the AVS if appropriate.     Patient or patient representative verbalized consent for the use of Ambient Listening during the visit with  MELISSA Stapleton for chart documentation. 6/20/2024  15:17 EDT

## 2024-07-09 ENCOUNTER — TELEPHONE (OUTPATIENT)
Dept: INTERNAL MEDICINE | Facility: CLINIC | Age: 49
End: 2024-07-09

## 2024-07-09 NOTE — TELEPHONE ENCOUNTER
Caller: Nico Haynes    Relationship: Self    Best call back number: [phone]     What was the call regarding: PATIENT WOULD LIKE TO SPEAK TO CLAUDIA MILAN REGARDING HER UPCOMING APPOINTMENT REGARDING THE FRACTURED RIB SHE HAS. PATIENT WOULD ALSO LIKE TO SPEAK TO HER ABOUT GETTING A DOCTORS NOTE    PLEASE CALL AND ADVISE

## 2024-07-16 ENCOUNTER — OFFICE VISIT (OUTPATIENT)
Dept: INTERNAL MEDICINE | Facility: CLINIC | Age: 49
End: 2024-07-16
Payer: COMMERCIAL

## 2024-07-16 VITALS
SYSTOLIC BLOOD PRESSURE: 124 MMHG | WEIGHT: 227 LBS | HEART RATE: 70 BPM | BODY MASS INDEX: 35.63 KG/M2 | HEIGHT: 67 IN | TEMPERATURE: 97.8 F | OXYGEN SATURATION: 98 % | RESPIRATION RATE: 14 BRPM | DIASTOLIC BLOOD PRESSURE: 74 MMHG

## 2024-07-16 DIAGNOSIS — S29.9XXA TRAUMATIC INJURY OF RIB: ICD-10-CM

## 2024-07-16 DIAGNOSIS — Z76.0 MEDICATION REFILL: ICD-10-CM

## 2024-07-16 DIAGNOSIS — Z09 FOLLOW-UP EXAM: Primary | ICD-10-CM

## 2024-07-16 DIAGNOSIS — Z02.89 ENCOUNTER TO OBTAIN EXCUSE FROM WORK: ICD-10-CM

## 2024-07-16 RX ORDER — LIDOCAINE 4 G/G
1 PATCH TOPICAL
COMMUNITY
Start: 2024-07-05 | End: 2024-07-16 | Stop reason: SDUPTHER

## 2024-07-16 RX ORDER — LIDOCAINE 4 G/G
1 PATCH TOPICAL EVERY 24 HOURS
Qty: 30 PATCH | Refills: 0 | Status: SHIPPED | OUTPATIENT
Start: 2024-07-16 | End: 2024-08-15

## 2024-07-16 NOTE — PROGRESS NOTES
"Chief Complaint  Rib Pain  Follow Up ER  FMLA paperwork     Subjective        Nico Haynes presents to [facility]  History of Present Illness  History of Present Illness  The patient presents for evaluation of a right rib injury.    She recounts a fall in the shower on 06/28/2024, during which she attempted to prevent herself from falling so hard. She did not lose consciousness and retained her memory of the incident. She sought help at an urgent care facility in [place], but received no results. Upon returning home, she visited the emergency room once due to severe pain. X-rays taken on 07/01/2024 and 07/05/2024 revealed a contusion of the right rib. She was advised to take Tylenol for pain, perform deep breathing exercises, and expected the healing process to take 4 to 6 weeks. She denies any loss of consciousness, head trauma, or other injuries. She discontinued the use of ibuprofen due to stomach cramping, despite eating before taking it. She was prescribed a lidocaine patch, with her last dose taken today. She is currently working from home. She is unable to wear a bra due to the pain. She denies any abdominal pain or shortness of breath.    She is requesting a work note/[facility] paperwork to be completed.        Objective   Vital Signs:  /74   Pulse 70   Temp 97.8 °F (36.6 °C)   Resp 14   Ht 170 cm (66.93\")   Wt 103 kg (227 lb)   SpO2 98%   BMI 35.63 kg/m²   Estimated body mass index is 35.63 kg/m² as calculated from the following:    Height as of this encounter: 170 cm (66.93\").    Weight as of this encounter: 103 kg (227 lb).          Physical Exam  Vitals and nursing note reviewed.   Constitutional:       Appearance: Normal appearance. She is obese.   HENT:      Head: Normocephalic.      Nose: Nose normal.      Mouth/Throat:      Mouth: Mucous membranes are moist.   Eyes:      Pupils: Pupils are equal, round, and reactive to light.   Cardiovascular:      Rate " and Rhythm: Normal rate and regular rhythm.      Pulses: Normal pulses.      Heart sounds: Normal heart sounds.      Comments: No peripheral edema noted.   Pulmonary:      Effort: Pulmonary effort is normal. No respiratory distress.      Breath sounds: Normal breath sounds. No stridor. No wheezing, rhonchi or rales.      Comments: Denies SOB  Chest:      Chest wall: No tenderness.   Musculoskeletal:         General: Tenderness and signs of injury present.      Comments: Pain anterior R rib    Skin:     General: Skin is warm.      Capillary Refill: Capillary refill takes less than 2 seconds.   Neurological:      Mental Status: She is alert and oriented to person, place, and time.   Psychiatric:         Behavior: Behavior normal.        Physical Exam       Result Review :      Common labs          10/17/2023    11:05 1/23/2024    11:27 4/5/2024    10:30   Common Labs   Glucose 86  80  90    BUN 13  11  11    Creatinine 0.77  0.79  0.74    Sodium 140  139  139    Potassium 4.5  4.7  4.9    Chloride 104  104  106    Calcium 9.9  9.4  9.4    Total Protein 7.3  6.8  6.9    Albumin 4.4  4.0  4.1    Total Bilirubin 0.5  0.5  0.5    Alkaline Phosphatase 94  102  102    AST (SGOT) 15  12  16    ALT (SGPT) 11  13  15    WBC 6.71  6.85  5.58    Hemoglobin 12.2  11.5  11.5    Hematocrit 38.3  34.9  36.5    Platelets 328  372  352    Total Cholesterol 245  249  245    Triglycerides 70  83  83    HDL Cholesterol 58  59  53    LDL Cholesterol  175  176  178    Hemoglobin A1C 4.90  4.50  4.50        Contusion of R rib from 07/01/2024 07/05/2024 ED for R sided rib pain   Results  Imaging  X-ray of the ribs shows no visible right-sided rib fracture.              Assessment and Plan     Diagnoses and all orders for this visit:    1. Follow-up exam (Primary)    2. Traumatic injury of rib    3. Encounter to obtain excuse from work    4. Medication refill    Other orders  -     Lidocaine 4 %; Place 1 patch on the skin as directed by  provider Daily for 30 days.  Dispense: 30 patch; Refill: 0      Assessment & Plan  1. Right rib contusion.  The healing process is expected to take 4 to 6 weeks. Cool compresses or ice application, 20 minutes on and 20 minutes off, are recommended. Increased movement is encouraged. Steroids are not prescribed as the healing process is satisfactory. Ibuprofen is to be taken 30 minutes after meals. Lidocaine patches 5 percent Transdermal are to be applied every 24 hours for 30 days. A work note is provided, with instructions to work from home starting from 07/23/2024, 8 hours per day, 5 days a week. A diet rich in fresh fruits, vegetables, and lean proteins is recommended to promote healing. Monitoring of blood pressure is advised. Should shortness of breath occur, immediate emergency room visit is advised.         I spent 35 minutes caring for Nico on this date of service. This time includes time spent by me in the following activities:preparing for the visit, reviewing tests, obtaining and/or reviewing a separately obtained history, performing a medically appropriate examination and/or evaluation , counseling and educating the patient/family/caregiver, ordering medications, tests, or procedures, referring and communicating with other health care professionals , documenting information in the medical record, independently interpreting results and communicating that information with the patient/family/caregiver, and care coordination  Follow Up     Return if symptoms worsen or fail to improve.  Patient was given instructions and counseling regarding her condition or for health maintenance advice. Please see specific information pulled into the AVS if appropriate.     Patient or patient representative verbalized consent for the use of Ambient Listening during the visit with  MELISSA Stapleton for chart documentation. 7/24/2024  13:51 EDT  Answers submitted by the patient for this visit:  Other (Submitted on  7/16/2024)  Please describe your symptoms.: Follow-up due to rib fracture  Have you had these symptoms before?: No  How long have you been having these symptoms?: 1-2 weeks  Please list any medications you are currently taking for this condition.: n/a  Please describe any probable cause for these symptoms. : n/a  Primary Reason for Visit (Submitted on 7/16/2024)  What is the primary reason for your visit?: Other

## 2024-07-18 ENCOUNTER — TELEPHONE (OUTPATIENT)
Dept: INTERNAL MEDICINE | Facility: CLINIC | Age: 49
End: 2024-07-18

## 2024-07-18 NOTE — TELEPHONE ENCOUNTER
Caller: Nico Haynes    Relationship: Self    Best call back number: [phone]    What form or medical record are you requesting: FMLA - PATIENT'S EMPLOYER DID NOT RECEIVE THIS YET    Who is requesting this form or medical record from you:     How would you like to receive the form or medical records (pick-up, mail, fax): FAX  If fax, what is the fax number: [phone]      Timeframe paperwork needed: ASAP    Additional notes: PLEASE ADVISE.

## 2024-07-25 ENCOUNTER — PATIENT MESSAGE (OUTPATIENT)
Dept: INTERNAL MEDICINE | Facility: CLINIC | Age: 49
End: 2024-07-25
Payer: COMMERCIAL

## 2024-08-02 RX ORDER — MONTELUKAST SODIUM 10 MG/1
10 TABLET ORAL NIGHTLY
Qty: 90 TABLET | Refills: 1 | Status: SHIPPED | OUTPATIENT
Start: 2024-08-02

## 2024-10-14 RX ORDER — HYDROXYZINE HYDROCHLORIDE 25 MG/1
TABLET, FILM COATED ORAL
Qty: 60 TABLET | Refills: 0 | Status: SHIPPED | OUTPATIENT
Start: 2024-10-14

## 2025-05-09 ENCOUNTER — OFFICE VISIT (OUTPATIENT)
Dept: INTERNAL MEDICINE | Facility: CLINIC | Age: 50
End: 2025-05-09
Payer: COMMERCIAL

## 2025-05-09 VITALS
DIASTOLIC BLOOD PRESSURE: 72 MMHG | RESPIRATION RATE: 16 BRPM | SYSTOLIC BLOOD PRESSURE: 118 MMHG | OXYGEN SATURATION: 96 % | WEIGHT: 237 LBS | HEART RATE: 66 BPM | TEMPERATURE: 97.9 F | BODY MASS INDEX: 37.2 KG/M2 | HEIGHT: 67 IN

## 2025-05-09 DIAGNOSIS — Z13.1 SCREENING FOR DIABETES MELLITUS: ICD-10-CM

## 2025-05-09 DIAGNOSIS — F32.A ANXIETY AND DEPRESSION: ICD-10-CM

## 2025-05-09 DIAGNOSIS — I10 PRIMARY HYPERTENSION: Primary | ICD-10-CM

## 2025-05-09 DIAGNOSIS — E66.01 MORBID (SEVERE) OBESITY DUE TO EXCESS CALORIES: ICD-10-CM

## 2025-05-09 DIAGNOSIS — Z13.220 ENCOUNTER FOR LIPID SCREENING FOR CARDIOVASCULAR DISEASE: ICD-10-CM

## 2025-05-09 DIAGNOSIS — Z00.00 ANNUAL PHYSICAL EXAM: ICD-10-CM

## 2025-05-09 DIAGNOSIS — F41.9 ANXIETY AND DEPRESSION: ICD-10-CM

## 2025-05-09 DIAGNOSIS — E78.2 MIXED HYPERLIPIDEMIA: ICD-10-CM

## 2025-05-09 DIAGNOSIS — Z13.6 ENCOUNTER FOR LIPID SCREENING FOR CARDIOVASCULAR DISEASE: ICD-10-CM

## 2025-05-09 LAB
25(OH)D3+25(OH)D2 SERPL-MCNC: 15 NG/ML (ref 30–100)
ALBUMIN SERPL-MCNC: 4 G/DL (ref 3.5–5.2)
ALBUMIN/GLOB SERPL: 1.3 G/DL
ALP SERPL-CCNC: 107 U/L (ref 39–117)
ALT SERPL-CCNC: 11 U/L (ref 1–33)
AST SERPL-CCNC: 17 U/L (ref 1–32)
BASOPHILS # BLD AUTO: 0.02 10*3/MM3 (ref 0–0.2)
BASOPHILS NFR BLD AUTO: 0.3 % (ref 0–1.5)
BILIRUB SERPL-MCNC: 0.6 MG/DL (ref 0–1.2)
BUN SERPL-MCNC: 13 MG/DL (ref 6–20)
BUN/CREAT SERPL: 15.5 (ref 7–25)
CALCIUM SERPL-MCNC: 9.5 MG/DL (ref 8.6–10.5)
CHLORIDE SERPL-SCNC: 102 MMOL/L (ref 98–107)
CHOLEST SERPL-MCNC: 253 MG/DL (ref 0–200)
CO2 SERPL-SCNC: 24.6 MMOL/L (ref 22–29)
CREAT SERPL-MCNC: 0.84 MG/DL (ref 0.57–1)
EGFRCR SERPLBLD CKD-EPI 2021: 86.4 ML/MIN/1.73
EOSINOPHIL # BLD AUTO: 0.13 10*3/MM3 (ref 0–0.4)
EOSINOPHIL NFR BLD AUTO: 2.1 % (ref 0.3–6.2)
ERYTHROCYTE [DISTWIDTH] IN BLOOD BY AUTOMATED COUNT: 11.8 % (ref 12.3–15.4)
GLOBULIN SER CALC-MCNC: 3.1 GM/DL
GLUCOSE SERPL-MCNC: 79 MG/DL (ref 65–99)
HBA1C MFR BLD: 4.7 % (ref 4.8–5.6)
HCT VFR BLD AUTO: 36.8 % (ref 34–46.6)
HDLC SERPL-MCNC: 58 MG/DL (ref 40–60)
HGB BLD-MCNC: 11.9 G/DL (ref 12–15.9)
IMM GRANULOCYTES # BLD AUTO: 0.02 10*3/MM3 (ref 0–0.05)
IMM GRANULOCYTES NFR BLD AUTO: 0.3 % (ref 0–0.5)
LDLC SERPL CALC-MCNC: 183 MG/DL (ref 0–100)
LYMPHOCYTES # BLD AUTO: 1.99 10*3/MM3 (ref 0.7–3.1)
LYMPHOCYTES NFR BLD AUTO: 31.5 % (ref 19.6–45.3)
MCH RBC QN AUTO: 28.9 PG (ref 26.6–33)
MCHC RBC AUTO-ENTMCNC: 32.3 G/DL (ref 31.5–35.7)
MCV RBC AUTO: 89.3 FL (ref 79–97)
MONOCYTES # BLD AUTO: 0.41 10*3/MM3 (ref 0.1–0.9)
MONOCYTES NFR BLD AUTO: 6.5 % (ref 5–12)
NEUTROPHILS # BLD AUTO: 3.75 10*3/MM3 (ref 1.7–7)
NEUTROPHILS NFR BLD AUTO: 59.3 % (ref 42.7–76)
NRBC BLD AUTO-RTO: 0 /100 WBC (ref 0–0.2)
PLATELET # BLD AUTO: 328 10*3/MM3 (ref 140–450)
POTASSIUM SERPL-SCNC: 4.5 MMOL/L (ref 3.5–5.2)
PROT SERPL-MCNC: 7.1 G/DL (ref 6–8.5)
RBC # BLD AUTO: 4.12 10*6/MM3 (ref 3.77–5.28)
SODIUM SERPL-SCNC: 139 MMOL/L (ref 136–145)
TRIGL SERPL-MCNC: 73 MG/DL (ref 0–150)
VIT B12 SERPL-MCNC: 943 PG/ML (ref 211–946)
VLDLC SERPL CALC-MCNC: 12 MG/DL (ref 5–40)
WBC # BLD AUTO: 6.32 10*3/MM3 (ref 3.4–10.8)

## 2025-05-09 RX ORDER — LISINOPRIL 10 MG/1
10 TABLET ORAL DAILY
Qty: 90 TABLET | Refills: 1 | Status: SHIPPED | OUTPATIENT
Start: 2025-05-09

## 2025-05-09 RX ORDER — MONTELUKAST SODIUM 10 MG/1
10 TABLET ORAL NIGHTLY
Qty: 90 TABLET | Refills: 1 | Status: SHIPPED | OUTPATIENT
Start: 2025-05-09

## 2025-05-09 RX ORDER — BUPROPION HYDROCHLORIDE 150 MG/1
150 TABLET ORAL DAILY
Qty: 30 TABLET | Refills: 1 | Status: SHIPPED | OUTPATIENT
Start: 2025-05-09 | End: 2025-06-08

## 2025-05-09 RX ORDER — HYDROXYZINE HYDROCHLORIDE 25 MG/1
25 TABLET, FILM COATED ORAL EVERY 8 HOURS PRN
Qty: 60 TABLET | Refills: 0 | Status: SHIPPED | OUTPATIENT
Start: 2025-05-09 | End: 2025-06-08

## 2025-05-09 NOTE — PROGRESS NOTES
Chief Complaint  Annual Exam    Anxiety and depression     Obesity     Subjective        Nico Haynes presents to [facility]  History of Present Illness  History of Present Illness      The patient presents for evaluation of fatigue, depression, and weight management.    She reports experiencing fatigue over the past few weeks, despite not having any recent illness. She has been adhering to her medication regimen, which includes lisinopril, hydroxyzine, and montelukast. She is seeking refills for these medications.    She expresses concern about a potential relapse into depression, a condition she has previously managed with medication. She attributes this to her current financial stressors, including job loss in 10/2024 and subsequent employment at [employer], which does not meet her financial needs. She is actively seeking alternative employment. She recently completed her semester with satisfactory grades in accounting and marketing and is considering further education to obtain her bachelor's degree. She ended a romantic relationship prior to her job loss and resumed counseling sessions two weeks ago. She recalls a previous trial of Lexapro, which she discontinued due to a lack of perceived benefit.    She is contemplating resuming Wegovy injections for weight management but is uncertain about insurance coverage. She has not engaged in physical exercise for the past month and is considering consulting a nutritionist.    She has been experiencing postnasal drip for the past few days, which she attributes to allergies. She is not currently taking any allergy medications.    She has a dental appointment scheduled for 06/2025 and plans to schedule an eye examination soon. She reports no issues with bowel movements.    SOCIAL HISTORY  She is currently working at [employer].       Objective   Vital Signs:  /72   Pulse 66   Temp 97.9 °F (36.6 °C) (Oral)   Resp 16   " Ht 170 cm (66.93\")   Wt 108 kg (237 lb)   SpO2 96%   BMI 37.20 kg/m²   Estimated body mass index is 37.2 kg/m² as calculated from the following:    Height as of this encounter: 170 cm (66.93\").    Weight as of this encounter: 108 kg (237 lb).           Physical Exam  Vitals and nursing note reviewed.   Constitutional:       Appearance: Normal appearance.   HENT:      Head: Normocephalic.      Right Ear: Tympanic membrane, ear canal and external ear normal.      Left Ear: Tympanic membrane, ear canal and external ear normal.      Nose: Nose normal.      Mouth/Throat:      Mouth: Mucous membranes are moist.   Eyes:      Pupils: Pupils are equal, round, and reactive to light.   Cardiovascular:      Rate and Rhythm: Normal rate and regular rhythm.      Pulses: Normal pulses.      Heart sounds: Normal heart sounds.      Comments: No peripheral edema noted  Pulmonary:      Effort: Pulmonary effort is normal. No respiratory distress.      Breath sounds: Normal breath sounds. No stridor. No wheezing, rhonchi or rales.      Comments: Denies SOB  Chest:      Chest wall: No tenderness.   Musculoskeletal:         General: Normal range of motion.   Skin:     General: Skin is warm.      Capillary Refill: Capillary refill takes less than 2 seconds.   Neurological:      Mental Status: She is alert and oriented to person, place, and time.   Psychiatric:         Behavior: Behavior normal.        Physical Exam  Mouth/Throat: Postnasal drip noted  Respiratory: Clear to auscultation, no wheezing, rales or rhonchi     Result Review :          Results     Office Visit with Naz St APRN (07/16/2024)            Assessment and Plan        Primary hypertension      Orders:    CBC & Differential    Comprehensive Metabolic Panel    Hemoglobin A1c    Lipid Panel    Vitamin D,25-Hydroxy    Vitamin B12    Mixed hyperlipidemia       Orders:    CBC & Differential    Comprehensive Metabolic Panel    Hemoglobin A1c    Lipid Panel    " Vitamin D,25-Hydroxy    Vitamin B12    Morbid (severe) obesity due to excess calories  Patient's (Body mass index is 37.2 kg/m².) indicates that they are morbidly/severely obese (BMI > 40 or > 35 with obesity - related health condition) with health conditions that include hypertension . Weight is improving with treatment. BMI  is above average; BMI management plan is completed. We discussed portion control, increasing exercise, joining a fitness center or start home based exercise program, Weight Watchers or other Commercial based weight reduction program, an marissa-based approach such as Squee Pal or Lose It, and Information on healthy weight added to patient's after visit summary.     Orders:    CBC & Differential    Comprehensive Metabolic Panel    Hemoglobin A1c    Lipid Panel    Vitamin D,25-Hydroxy    Vitamin B12    Ambulatory Referral to Nutrition Services    Anxiety and depression      Orders:    CBC & Differential    Comprehensive Metabolic Panel    Hemoglobin A1c    Lipid Panel    Vitamin D,25-Hydroxy    Vitamin B12    Annual physical exam    Orders:    CBC & Differential    Comprehensive Metabolic Panel    Hemoglobin A1c    Lipid Panel    Vitamin D,25-Hydroxy    Vitamin B12    Screening for diabetes mellitus    Orders:    CBC & Differential    Comprehensive Metabolic Panel    Hemoglobin A1c    Lipid Panel    Vitamin D,25-Hydroxy    Vitamin B12    Encounter for lipid screening for cardiovascular disease    Orders:    CBC & Differential    Comprehensive Metabolic Panel    Hemoglobin A1c    Lipid Panel    Vitamin D,25-Hydroxy    Vitamin B12       Assessment & Plan  1. Fatigue.  - A comprehensive blood workup will be conducted today, including assessments for vitamin D and B12 levels.    2. Depression.  - She has been counseled on the potential side effects of Wellbutrin, including initial diarrhea and brain fog, which typically resolve.  - A prescription for Wellbutrin 150 mg daily for 30 days has been  issued, with a refill provided.  - The prescription will be sent to the pharmacy. If she experiences any adverse effects, she is advised to discontinue the medication and inform the provider.  - Continuation of counseling sessions has been recommended.    3. Weight management.  - She has been informed that Wellbutrin may contribute to weight loss.  - A referral to a nutritionist has been made.    4. Allergies.  - She has been advised to take Claritin or Zyrtec during the day, in conjunction with Singulair at bedtime.    5. Medication management.  - Refills for lisinopril and hydroxyzine 25 mg (60 tablets) have been provided.  - Montelukast (Singulair) will also be refilled.    Follow-up  - The patient will follow up via telehealth in approximately 6 to 8 weeks.         I spent 30 minutes caring for Nico on this date of service. This time includes time spent by me in the following activities:preparing for the visit, reviewing tests, obtaining and/or reviewing a separately obtained history, performing a medically appropriate examination and/or evaluation , counseling and educating the patient/family/caregiver, ordering medications, tests, or procedures, referring and communicating with other health care professionals , documenting information in the medical record, independently interpreting results and communicating that information with the patient/family/caregiver, and care coordination  Follow Up     Return in about 6 weeks (around 6/17/2025) for Recheck.  Patient was given instructions and counseling regarding her condition or for health maintenance advice. Please see specific information pulled into the AVS if appropriate.     Patient or patient representative verbalized consent for the use of Ambient Listening during the visit with  MELISSA Stapleton for chart documentation. 5/9/2025  08:47 EDT

## 2025-05-09 NOTE — ASSESSMENT & PLAN NOTE
Orders:    CBC & Differential    Comprehensive Metabolic Panel    Hemoglobin A1c    Lipid Panel    Vitamin D,25-Hydroxy    Vitamin B12

## 2025-05-13 ENCOUNTER — TELEMEDICINE (OUTPATIENT)
Dept: INTERNAL MEDICINE | Facility: CLINIC | Age: 50
End: 2025-05-13
Payer: COMMERCIAL

## 2025-05-13 DIAGNOSIS — E78.2 MIXED HYPERLIPIDEMIA: Primary | ICD-10-CM

## 2025-05-13 PROCEDURE — 99213 OFFICE O/P EST LOW 20 MIN: CPT | Performed by: NURSE PRACTITIONER

## 2025-05-13 RX ORDER — ATORVASTATIN CALCIUM 10 MG/1
10 TABLET, FILM COATED ORAL DAILY
Qty: 90 TABLET | Refills: 0 | Status: SHIPPED | OUTPATIENT
Start: 2025-05-13 | End: 2025-08-11

## 2025-05-23 NOTE — ASSESSMENT & PLAN NOTE
Lipid abnormalities are worsening    Plan:  Continue taking the following medication/s;  Atorvastatin 10 mg's daily.      Discussed medication dosage, use, side effects, and goals of treatment in detail.    Counseled patient on lifestyle modifications to help control hyperlipidemia.     Patient Treatment Goals:   LDL goal is less than 70    Followup in 3 months.

## 2025-05-23 NOTE — PROGRESS NOTES
"Chief Complaint  Hyperlipidemia    Subjective        Nico Haynes presents to [facility]  History of Present Illness  History of Present Illness    You have chosen to receive care through a telehealth visit.  Do you consent to use a video/audio connection for your medical care today? Yes     Patient is currently in [facility] and I am currently on campus in [facility] doing a telehealth conference today.  Recent laboratory studies shows hyperlipidemia is worsening.         Objective   Vital Signs:  There were no vitals taken for this visit.  Estimated body mass index is 37.2 kg/m² as calculated from the following:    Height as of 5/9/25: 170 cm (66.93\").    Weight as of 5/9/25: 108 kg (237 lb).               Physical Exam  Constitutional:       Appearance: Normal appearance.   Neurological:      Mental Status: She is alert and oriented to person, place, and time.   Psychiatric:         Behavior: Behavior normal.        Physical Exam       Result Review :      Common labs          5/9/2025    08:48   Common Labs   Glucose 79    BUN 13    Creatinine 0.84    Sodium 139    Potassium 4.5    Chloride 102    Calcium 9.5    Albumin 4.0    Total Bilirubin 0.6    Alkaline Phosphatase 107    AST (SGOT) 17    ALT (SGPT) 11    WBC 6.32    Hemoglobin 11.9    Hematocrit 36.8    Platelets 328    Total Cholesterol 253    Triglycerides 73    HDL Cholesterol 58    LDL Cholesterol  183    Hemoglobin A1C 4.70        Results                Assessment and Plan        Mixed hyperlipidemia   Lipid abnormalities are worsening    Plan:  Continue taking the following medication/s;  Atorvastatin 10 mg's daily.      Discussed medication dosage, use, side effects, and goals of treatment in detail.    Counseled patient on lifestyle modifications to help control hyperlipidemia.     Patient Treatment Goals:   LDL goal is less than 70    Followup in 3 months.            Assessment & " Plan           I spent 25 minutes caring for Nico on this date of service. This time includes time spent by me in the following activities:preparing for the visit, reviewing tests, obtaining and/or reviewing a separately obtained history, performing a medically appropriate examination and/or evaluation , counseling and educating the patient/family/caregiver, ordering medications, tests, or procedures, referring and communicating with other health care professionals , documenting information in the medical record, independently interpreting results and communicating that information with the patient/family/caregiver, and care coordination  Follow Up     Return in about 3 months (around 8/13/2025) for Recheck.  Patient was given instructions and counseling regarding her condition or for health maintenance advice. Please see specific information pulled into the AVS if appropriate.     Patient or patient representative verbalized consent for the use of Ambient Listening during the visit with  MELISSA Stapleton for chart documentation. 5/23/2025  10:44 EDT

## 2025-06-17 ENCOUNTER — TELEMEDICINE (OUTPATIENT)
Dept: INTERNAL MEDICINE | Facility: CLINIC | Age: 50
End: 2025-06-17
Payer: COMMERCIAL

## 2025-06-17 VITALS — BODY MASS INDEX: 36.3 KG/M2 | WEIGHT: 231.3 LBS

## 2025-06-17 DIAGNOSIS — E78.2 MIXED HYPERLIPIDEMIA: ICD-10-CM

## 2025-06-17 DIAGNOSIS — Z09 FOLLOW-UP EXAM: ICD-10-CM

## 2025-06-17 DIAGNOSIS — F41.9 ANXIETY AND DEPRESSION: Primary | ICD-10-CM

## 2025-06-17 DIAGNOSIS — Z76.0 MEDICATION REFILL: ICD-10-CM

## 2025-06-17 DIAGNOSIS — F32.A ANXIETY AND DEPRESSION: Primary | ICD-10-CM

## 2025-06-17 PROCEDURE — 99214 OFFICE O/P EST MOD 30 MIN: CPT | Performed by: NURSE PRACTITIONER

## 2025-06-17 RX ORDER — BUPROPION HYDROCHLORIDE 150 MG/1
150 TABLET ORAL DAILY
Qty: 90 TABLET | Refills: 1 | Status: SHIPPED | OUTPATIENT
Start: 2025-06-17 | End: 2025-09-15

## 2025-06-17 NOTE — PROGRESS NOTES
"Chief Complaint  Anxiety with depression follow     HLD f/u    Subjective        Nico Haynes presents to [facility]  History of Present Illness  History of Present Illness    You have chosen to receive care through a telehealth visit.  Do you consent to use a video/audio connection for your medical care today? Yes    Is currently in [facility] and I am currently on campus in [facility] doing a telehealth conference today.    The patient presents via virtual visit for evaluation of hyperlipidemia and weight management.    She has initiated atorvastatin therapy, administered in the evening, and reports no adverse effects from the medication. She is currently on a regimen of Wellbutrin  mg, taken daily, and has observed a positive change in her condition since the third week of treatment. She recently refilled her prescription for Wellbutrin  mg.    She has experienced a weight loss of 6 pounds over a span of 2 weeks. Her current weight is recorded at 230.3 pounds. She has been engaging in physical activity during her 15-minute breaks at work, which includes walking twice daily and opting for stairs instead of elevators. Her dietary modifications include a morning fast, consuming watermelon for lunch, and a balanced dinner comprising salad and protein. She has also incorporated protein shakes into her diet, with this being her third day of consumption. However, she has not yet established a specific daily protein intake goal. She is making conscious efforts to maintain a healthy diet and ensure regular physical activity.       Objective   Vital Signs:  Wt 105 kg (231 lb 4.8 oz)   BMI 36.30 kg/m²   Estimated body mass index is 36.3 kg/m² as calculated from the following:    Height as of 5/9/25: 170 cm (66.93\").    Weight as of this encounter: 105 kg (231 lb 4.8 oz).               Physical Exam  Constitutional:       Appearance: Normal appearance. "   Neurological:      Mental Status: She is alert and oriented to person, place, and time.   Psychiatric:         Behavior: Behavior normal.        Physical Exam       Result Review :      Common labs          5/9/2025    08:48   Common Labs   Glucose 79    BUN 13    Creatinine 0.84    Sodium 139    Potassium 4.5    Chloride 102    Calcium 9.5    Albumin 4.0    Total Bilirubin 0.6    Alkaline Phosphatase 107    AST (SGOT) 17    ALT (SGPT) 11    WBC 6.32    Hemoglobin 11.9    Hematocrit 36.8    Platelets 328    Total Cholesterol 253    Triglycerides 73    HDL Cholesterol 58    LDL Cholesterol  183    Hemoglobin A1C 4.70        Results  Labs   - Total cholesterol: 253 mg/dL   - LDL: 183 mg/dL   - A1c: Normal              Assessment and Plan        Anxiety and depression           Mixed hyperlipidemia       Orders:    Lipid Panel; Future    Follow-up exam         Medication refill            Assessment & Plan  1. Hyperlipidemia.  - Total cholesterol level is elevated at 253, with an LDL of 183.  - No reported side effects from atorvastatin.  - Cholesterol levels to be re-evaluated in 1 month; labs ordered.  - Atorvastatin prescribed; nutritional appointment scheduled for 09/2025.    2. Weight management.  - Weight loss of 6 pounds in 2 weeks.  - Increased physical activity by walking during breaks and taking stairs.  - Dietary modifications include watermelon, salad, protein, and protein shakes.  - Wellbutrin  mg taken daily; prescription sent to pharmacy.         I spent 35 minutes caring for Nico on this date of service. This time includes time spent by me in the following activities:preparing for the visit, reviewing tests, obtaining and/or reviewing a separately obtained history, performing a medically appropriate examination and/or evaluation , counseling and educating the patient/family/caregiver, ordering medications, tests, or procedures, referring and communicating with other health care professionals ,  documenting information in the medical record, independently interpreting results and communicating that information with the patient/family/caregiver, and care coordination  Follow Up     Return in about 3 months (around 9/17/2025) for Recheck.  Patient was given instructions and counseling regarding her condition or for health maintenance advice. Please see specific information pulled into the AVS if appropriate.     Patient or patient representative verbalized consent for the use of Ambient Listening during the visit with  MELISSA Stapleton for chart documentation. 6/26/2025  17:39 EDT

## 2025-08-11 RX ORDER — ATORVASTATIN CALCIUM 10 MG/1
10 TABLET, FILM COATED ORAL DAILY
Qty: 90 TABLET | Refills: 0 | Status: SHIPPED | OUTPATIENT
Start: 2025-08-11

## 2025-08-11 RX ORDER — ATORVASTATIN CALCIUM 10 MG/1
10 TABLET, FILM COATED ORAL DAILY
Qty: 90 TABLET | Refills: 0 | OUTPATIENT
Start: 2025-08-11